# Patient Record
Sex: FEMALE | Race: WHITE | NOT HISPANIC OR LATINO | Employment: OTHER | URBAN - METROPOLITAN AREA
[De-identification: names, ages, dates, MRNs, and addresses within clinical notes are randomized per-mention and may not be internally consistent; named-entity substitution may affect disease eponyms.]

---

## 2019-05-06 ENCOUNTER — HOSPITAL ENCOUNTER (OUTPATIENT)
Dept: RADIOLOGY | Facility: HOSPITAL | Age: 64
Discharge: HOME | End: 2019-05-06
Attending: INTERNAL MEDICINE
Payer: MEDICARE

## 2019-05-06 ENCOUNTER — OFFICE VISIT (OUTPATIENT)
Dept: GASTROENTEROLOGY | Facility: CLINIC | Age: 64
End: 2019-05-06
Payer: MEDICARE

## 2019-05-06 VITALS
TEMPERATURE: 98 F | DIASTOLIC BLOOD PRESSURE: 65 MMHG | BODY MASS INDEX: 28 KG/M2 | OXYGEN SATURATION: 99 % | HEART RATE: 92 BPM | SYSTOLIC BLOOD PRESSURE: 136 MMHG | WEIGHT: 164 LBS | HEIGHT: 64 IN

## 2019-05-06 DIAGNOSIS — K21.00 GASTROESOPHAGEAL REFLUX DISEASE WITH ESOPHAGITIS: ICD-10-CM

## 2019-05-06 DIAGNOSIS — K57.30 DIVERTICULOSIS OF LARGE INTESTINE WITHOUT HEMORRHAGE: ICD-10-CM

## 2019-05-06 DIAGNOSIS — K86.1 IDIOPATHIC CHRONIC PANCREATITIS: ICD-10-CM

## 2019-05-06 DIAGNOSIS — M75.101 PAINFUL ARC SYNDROME OF RIGHT SHOULDER: Primary | ICD-10-CM

## 2019-05-06 DIAGNOSIS — M19.90 ARTHRITIS: ICD-10-CM

## 2019-05-06 DIAGNOSIS — G89.4 CHRONIC PAIN SYNDROME: ICD-10-CM

## 2019-05-06 DIAGNOSIS — K31.1 PARTIAL GASTRIC OUTLET OBSTRUCTION: ICD-10-CM

## 2019-05-06 DIAGNOSIS — M75.101 PAINFUL ARC SYNDROME OF RIGHT SHOULDER: ICD-10-CM

## 2019-05-06 PROCEDURE — 99213 PR OFFICE/OUTPT VISIT, EST, LEVL III, 20-29 MIN: ICD-10-PCS | Mod: S$PBB,,, | Performed by: INTERNAL MEDICINE

## 2019-05-06 PROCEDURE — 99999 PR PBB SHADOW E&M-EST. PATIENT-LVL III: CPT | Mod: PBBFAC,,, | Performed by: INTERNAL MEDICINE

## 2019-05-06 PROCEDURE — 73030 X-RAY EXAM OF SHOULDER: CPT | Mod: 26,RT,, | Performed by: RADIOLOGY

## 2019-05-06 PROCEDURE — 73030 XR SHOULDER COMPLETE 2 OR MORE VIEWS RIGHT: ICD-10-PCS | Mod: 26,RT,, | Performed by: RADIOLOGY

## 2019-05-06 PROCEDURE — 99213 OFFICE O/P EST LOW 20 MIN: CPT | Mod: PBBFAC,25,PN | Performed by: INTERNAL MEDICINE

## 2019-05-06 PROCEDURE — 99999 PR PBB SHADOW E&M-EST. PATIENT-LVL III: ICD-10-PCS | Mod: PBBFAC,,, | Performed by: INTERNAL MEDICINE

## 2019-05-06 PROCEDURE — 73030 X-RAY EXAM OF SHOULDER: CPT | Mod: TC,PN,RT

## 2019-05-06 PROCEDURE — 99213 OFFICE O/P EST LOW 20 MIN: CPT | Mod: S$PBB,,, | Performed by: INTERNAL MEDICINE

## 2019-05-06 RX ORDER — CYANOCOBALAMIN 1000 UG/ML
INJECTION, SOLUTION INTRAMUSCULAR; SUBCUTANEOUS
Refills: 6 | COMMUNITY
Start: 2019-05-02 | End: 2019-08-27 | Stop reason: SDUPTHER

## 2019-05-06 RX ORDER — DILTIAZEM HYDROCHLORIDE 180 MG/1
180 CAPSULE, COATED, EXTENDED RELEASE ORAL
COMMUNITY
End: 2019-06-10

## 2019-05-06 RX ORDER — POTASSIUM CHLORIDE 1.5 G/1.58G
20 POWDER, FOR SOLUTION ORAL
COMMUNITY
End: 2019-06-10

## 2019-05-06 RX ORDER — METFORMIN HYDROCHLORIDE 500 MG/1
TABLET ORAL
Refills: 1 | COMMUNITY
Start: 2019-03-14 | End: 2019-07-03 | Stop reason: SDUPTHER

## 2019-05-06 RX ORDER — OXYCODONE HYDROCHLORIDE 15 MG/1
TABLET ORAL
Refills: 0 | COMMUNITY
Start: 2019-05-02 | End: 2019-12-19

## 2019-05-06 RX ORDER — DEXLANSOPRAZOLE 60 MG/1
60 CAPSULE, DELAYED RELEASE ORAL
COMMUNITY
End: 2019-05-06 | Stop reason: SDUPTHER

## 2019-05-06 RX ORDER — RIZATRIPTAN BENZOATE 10 MG/1
TABLET ORAL
Refills: 3 | COMMUNITY
Start: 2019-04-22 | End: 2019-06-10

## 2019-05-06 RX ORDER — QUETIAPINE FUMARATE 300 MG/1
TABLET, FILM COATED ORAL
Refills: 11 | COMMUNITY
Start: 2019-04-29

## 2019-05-06 RX ORDER — DEXLANSOPRAZOLE 60 MG/1
60 CAPSULE, DELAYED RELEASE ORAL DAILY
Qty: 90 CAPSULE | Refills: 1 | Status: SHIPPED | OUTPATIENT
Start: 2019-05-06 | End: 2019-07-08 | Stop reason: SDUPTHER

## 2019-05-06 RX ORDER — TIZANIDINE 4 MG/1
TABLET ORAL
Refills: 6 | COMMUNITY
Start: 2019-02-12 | End: 2019-06-10

## 2019-05-06 RX ORDER — PROMETHAZINE HYDROCHLORIDE 50 MG/1
50 TABLET ORAL EVERY 6 HOURS PRN
Qty: 100 TABLET | Refills: 1 | Status: SHIPPED | OUTPATIENT
Start: 2019-05-06 | End: 2019-08-27 | Stop reason: SDUPTHER

## 2019-05-06 RX ORDER — FUROSEMIDE 20 MG/1
TABLET ORAL
Refills: 1 | COMMUNITY
Start: 2019-03-25

## 2019-05-06 RX ORDER — VENLAFAXINE HYDROCHLORIDE 37.5 MG/1
37.5 CAPSULE, EXTENDED RELEASE ORAL
COMMUNITY
End: 2019-06-10

## 2019-05-06 RX ORDER — RIVAROXABAN 20 MG/1
20 TABLET, FILM COATED ORAL
Refills: 1 | COMMUNITY
Start: 2019-05-02

## 2019-05-06 RX ORDER — SUMATRIPTAN SUCCINATE 100 MG/1
TABLET ORAL
Refills: 3 | COMMUNITY
Start: 2019-04-02 | End: 2019-06-10

## 2019-05-06 RX ORDER — PROMETHAZINE HYDROCHLORIDE 50 MG/1
50 TABLET ORAL
COMMUNITY
End: 2019-05-06 | Stop reason: SDUPTHER

## 2019-05-06 RX ORDER — HYDRALAZINE HYDROCHLORIDE 25 MG/1
TABLET, FILM COATED ORAL
Refills: 5 | COMMUNITY
Start: 2019-04-22 | End: 2021-04-05

## 2019-05-06 RX ORDER — METOPROLOL SUCCINATE 50 MG/1
TABLET, EXTENDED RELEASE ORAL
Refills: 1 | COMMUNITY
Start: 2019-04-02 | End: 2020-10-06

## 2019-05-06 RX ORDER — CEPHALEXIN 500 MG/1
CAPSULE ORAL
Refills: 5 | COMMUNITY
Start: 2019-04-02 | End: 2019-09-19

## 2019-05-06 NOTE — PATIENT INSTRUCTIONS
He will continue reflux regimen.  She is scheduled for endoscopic dilatation of her gastrojejunostomy.  She has had abdominal pain and nausea and vomiting. She understands the procedure and has been dilated in the past.  Specifically she realizes that this is extremely small incidence of bleeding perforation or aspiration.  She will follow up with her nephrologist and continue her current medications.  She complains of a painful right shoulder.  She appears to have full range of motion x-ray will be obtained.  She will bring me a copy of her labs.  She follows up in the Pain Clinic.

## 2019-05-06 NOTE — PROGRESS NOTES
Subjective:       Patient ID: Tessie De La Rosa is a 64 y.o. female.    Chief Complaint: Follow-up    She complains upper abdominal discomfort with nausea and episodes of vomiting. She has a duodenal obstruction from the chronic pancreatitis and has a gastrojejunostomy in the past the narrowed gastrojejunostomy has been dilated.  She is followed by the nephrologist for stage III renal disease and has anemia.  She denies hematemesis hematochezia jaundice.  She is going to the Pain Clinic catheterization for the chronic abdominal pain and joint pain.  She takes the medications as prescribed.  She has a painful right shoulder I slept on it wrong.  She possibly could have been treated and an x-ray will be obtained. She has a history of arthritis and chronic back pain.  Her  dermatitis is well controlled.  And she is followed by the dermatologist she denies fever chills. She has occasional constipation.  In the last year she has had a colonoscopy showing diverticulosis and hemorrhoids.  She will continue her bowel program to avoid constipation. She had recent labs obtained will bring a copy.  She has had multiple dilatations of her gastrojejunostomy and has good health knowledge in she understands the procedure, the potential risk gone and alternatives.  She realizes there is extremely small his sister bleeding perforation or aspiration      Allergies:  Review of patient's allergies indicates:   Allergen Reactions    Morphine      Muscle spasms, ABD pain    Quinine      Causes thrombocytopenia    Alprazolam     Diazepam     Duloxetine hcl     Pregabalin        Medications:    Current Outpatient Medications:     cephALEXin (KEFLEX) 500 MG capsule, , Disp: , Rfl: 5    cyanocobalamin 1,000 mcg/mL injection, , Disp: , Rfl: 6    dexlansoprazole (DEXILANT) 60 mg capsule, Take 60 mg by mouth., Disp: , Rfl:     diltiaZEM (CARDIZEM CD) 180 MG 24 hr capsule, Take 180 mg by mouth., Disp: , Rfl:     furosemide  (LASIX) 20 MG tablet, , Disp: , Rfl: 1    hydrALAZINE (APRESOLINE) 25 MG tablet, , Disp: , Rfl: 5    metFORMIN (GLUCOPHAGE) 500 MG tablet, , Disp: , Rfl: 1    metoprolol succinate (TOPROL-XL) 50 MG 24 hr tablet, , Disp: , Rfl: 1    oxyCODONE (ROXICODONE) 15 MG Tab, , Disp: , Rfl: 0    potassium chloride (KLOR-CON) 20 mEq Pack, Take 20 mEq by mouth., Disp: , Rfl:     promethazine (PHENERGAN) 50 MG tablet, Take 50 mg by mouth., Disp: , Rfl:     QUEtiapine (SEROQUEL) 300 MG Tab, , Disp: , Rfl: 11    rizatriptan (MAXALT) 10 MG tablet, , Disp: , Rfl: 3    sumatriptan (IMITREX) 100 MG tablet, , Disp: , Rfl: 3    tiZANidine (ZANAFLEX) 4 MG tablet, , Disp: , Rfl: 6    venlafaxine (EFFEXOR-XR) 37.5 MG 24 hr capsule, Take 37.5 mg by mouth., Disp: , Rfl:     XARELTO 20 mg Tab, , Disp: , Rfl: 1    Past Medical History:   Diagnosis Date    Acute pancreatitis     Anemia     Chronic constipation     Diverticulitis     Diverticulosis     GERD (gastroesophageal reflux disease)     Irritable bowel syndrome        Past Surgical History:   Procedure Laterality Date    CHOLECYSTECTOMY      COLONOSCOPY      UPPER GASTROINTESTINAL ENDOSCOPY           Review of Systems   Constitutional: Positive for fatigue. Negative for appetite change, fever and unexpected weight change.   HENT: Negative for trouble swallowing.         No jaundice.   Respiratory: Negative for cough, shortness of breath and wheezing.         No Rales, Rhonchi, or Dyspnea.   Cardiovascular: Negative for chest pain.   Gastrointestinal: Positive for abdominal pain and constipation.        She has chronic pancreatitis and has a chronic pain syndrome.  She is intolerant to the anti-inflammatory agents and is followed in the Pain Clinic.  She denies jaundice or bleeding but her alkaline phosphatase has remained elevated.  She has had a cholecystectomy she has a gastrojejunostomy and has needed to have dilatation at intervals it has been 8 months and she  was recently dilated.  In general she limits her diet and avoidance of foods that cause abdominal discomfort.  She has been able to maintain her weight.  She denies jaundice hematemesis hematochezia or bleeding.  She does have constipation.   Musculoskeletal: Positive for arthralgias and back pain. Negative for neck pain.        Right shoulder pain   Skin: Negative for pallor and rash.   Neurological: Negative for dizziness, seizures, syncope, speech difficulty, weakness and numbness.   Hematological: Negative for adenopathy.   Psychiatric/Behavioral: Negative for confusion.       Objective:      Physical Exam   Constitutional: She is oriented to person, place, and time. She appears well-developed and well-nourished.   She is a well-nourished well-hydrated nonicteric white female   HENT:   Head: Normocephalic.   Eyes: Pupils are equal, round, and reactive to light. EOM are normal.   Neck: Normal range of motion. Neck supple. No tracheal deviation present. No thyromegaly present.   Cardiovascular: Normal rate, regular rhythm and normal heart sounds.   Pulmonary/Chest: Effort normal and breath sounds normal.   Abdominal: Soft. Bowel sounds are normal. She exhibits no mass. There is tenderness. There is no guarding. No hernia.   The abdomen as above plane is mildly obese with he was scars.  There is tenderness in the epigastrium.  Masses or organomegaly not detected.  Bowel sounds are normal.  There are healed surgical scars   Musculoskeletal:   There appears to be limited range of motion of the right shoulder.  There is possibly a swollen area at the at the top it is not warm to touch and is not red in nature   Lymphadenopathy:     She has no cervical adenopathy.   Neurological: She is alert and oriented to person, place, and time. No cranial nerve deficit.   Skin: Skin is warm and dry.   Psychiatric: She has a normal mood and affect. Her behavior is normal.   Vitals reviewed.        Plan:       Idiopathic chronic  pancreatitis    Partial gastric outlet obstruction    Gastroesophageal reflux disease with esophagitis    Diverticulosis of large intestine without hemorrhage    Chronic pain syndrome

## 2019-05-07 ENCOUNTER — TELEPHONE (OUTPATIENT)
Dept: GASTROENTEROLOGY | Facility: CLINIC | Age: 64
End: 2019-05-07

## 2019-05-07 DIAGNOSIS — K22.2 ESOPHAGEAL STRICTURE: Primary | ICD-10-CM

## 2019-05-07 NOTE — TELEPHONE ENCOUNTER
Informed pt hat provider did not send in muscle relaxer due to interactions with other medications she takes. Also informed pt that the OR will call her the day before the procedure to give her an arrival time for the procedure. Pt verbalized understanding.

## 2019-05-07 NOTE — TELEPHONE ENCOUNTER
----- Message from Ignacio Perez sent at 5/7/2019  8:19 AM CDT -----  Contact: patient  Type: Needs Medical Advice    Who Called:  Patient  Symptoms (please be specific):    How long has patient had these symptoms:    Pharmacy name and phone #:  [facility],[place] [phone]  Best Call Back Number: [phone]  Additional Information: called to advise that Rx wasn't sent for muscle relaxer to pharmacy.and wanted to apologize to the  for yesterday.stated that she was in a lot of pain and still is today. Requesting a call back regarding procedure

## 2019-05-21 ENCOUNTER — ANESTHESIA EVENT (OUTPATIENT)
Dept: SURGERY | Facility: HOSPITAL | Age: 64
End: 2019-05-21
Payer: MEDICARE

## 2019-05-21 ENCOUNTER — ANESTHESIA (OUTPATIENT)
Dept: SURGERY | Facility: HOSPITAL | Age: 64
End: 2019-05-21
Payer: MEDICARE

## 2019-05-21 ENCOUNTER — HOSPITAL ENCOUNTER (OUTPATIENT)
Facility: HOSPITAL | Age: 64
Discharge: HOME | End: 2019-05-21
Attending: INTERNAL MEDICINE | Admitting: INTERNAL MEDICINE
Payer: MEDICARE

## 2019-05-21 VITALS
BODY MASS INDEX: 27.31 KG/M2 | DIASTOLIC BLOOD PRESSURE: 71 MMHG | HEIGHT: 64 IN | OXYGEN SATURATION: 97 % | HEART RATE: 77 BPM | SYSTOLIC BLOOD PRESSURE: 119 MMHG | RESPIRATION RATE: 15 BRPM | TEMPERATURE: 98 F | WEIGHT: 160 LBS

## 2019-05-21 DIAGNOSIS — K22.2 ESOPHAGEAL STRICTURE: ICD-10-CM

## 2019-05-21 DIAGNOSIS — Z01.818 PRE-OP EXAM: ICD-10-CM

## 2019-05-21 DIAGNOSIS — K56.699 ANASTOMOTIC STENOSIS OF GASTROJEJUNOSTOMY: Primary | ICD-10-CM

## 2019-05-21 PROBLEM — K21.00 GASTROESOPHAGEAL REFLUX DISEASE WITH ESOPHAGITIS: Status: ACTIVE | Noted: 2019-05-21

## 2019-05-21 PROBLEM — R14.0 ABDOMINAL DISTENSION: Status: ACTIVE | Noted: 2019-05-21

## 2019-05-21 PROBLEM — R10.9 ABDOMINAL PAIN: Status: ACTIVE | Noted: 2019-05-21

## 2019-05-21 PROBLEM — K44.9 HIATAL HERNIA: Status: ACTIVE | Noted: 2019-05-21

## 2019-05-21 PROBLEM — R11.0 NAUSEA: Status: ACTIVE | Noted: 2019-05-21

## 2019-05-21 LAB — POCT GLUCOSE: 161 MG/DL (ref 70–110)

## 2019-05-21 PROCEDURE — D9220A PRA ANESTHESIA: ICD-10-PCS | Mod: ANES,,, | Performed by: ANESTHESIOLOGY

## 2019-05-21 PROCEDURE — 37000008 HC ANESTHESIA 1ST 15 MINUTES: Performed by: INTERNAL MEDICINE

## 2019-05-21 PROCEDURE — 63600175 PHARM REV CODE 636 W HCPCS: Performed by: NURSE ANESTHETIST, CERTIFIED REGISTERED

## 2019-05-21 PROCEDURE — D9220A PRA ANESTHESIA: Mod: ANES,,, | Performed by: ANESTHESIOLOGY

## 2019-05-21 PROCEDURE — C1726 CATH, BAL DIL, NON-VASCULAR: HCPCS | Performed by: INTERNAL MEDICINE

## 2019-05-21 PROCEDURE — S0028 INJECTION, FAMOTIDINE, 20 MG: HCPCS

## 2019-05-21 PROCEDURE — 43235 PR EGD, FLEX, DIAGNOSTIC: ICD-10-PCS | Mod: ,,, | Performed by: INTERNAL MEDICINE

## 2019-05-21 PROCEDURE — 25000003 PHARM REV CODE 250

## 2019-05-21 PROCEDURE — D9220A PRA ANESTHESIA: ICD-10-PCS | Mod: CRNA,,, | Performed by: NURSE ANESTHETIST, CERTIFIED REGISTERED

## 2019-05-21 PROCEDURE — 43235 EGD DIAGNOSTIC BRUSH WASH: CPT | Mod: ,,, | Performed by: INTERNAL MEDICINE

## 2019-05-21 PROCEDURE — 93005 ELECTROCARDIOGRAM TRACING: CPT

## 2019-05-21 PROCEDURE — 37000009 HC ANESTHESIA EA ADD 15 MINS: Performed by: INTERNAL MEDICINE

## 2019-05-21 PROCEDURE — 43249 ESOPH EGD DILATION <30 MM: CPT | Performed by: INTERNAL MEDICINE

## 2019-05-21 PROCEDURE — 25000003 PHARM REV CODE 250: Performed by: ANESTHESIOLOGY

## 2019-05-21 PROCEDURE — D9220A PRA ANESTHESIA: Mod: CRNA,,, | Performed by: NURSE ANESTHETIST, CERTIFIED REGISTERED

## 2019-05-21 RX ORDER — DIPHENHYDRAMINE HYDROCHLORIDE 50 MG/ML
12.5 INJECTION INTRAMUSCULAR; INTRAVENOUS
Status: DISCONTINUED | OUTPATIENT
Start: 2019-05-21 | End: 2019-05-21 | Stop reason: HOSPADM

## 2019-05-21 RX ORDER — ONDANSETRON 2 MG/ML
4 INJECTION INTRAMUSCULAR; INTRAVENOUS DAILY PRN
Status: DISCONTINUED | OUTPATIENT
Start: 2019-05-21 | End: 2019-05-21 | Stop reason: HOSPADM

## 2019-05-21 RX ORDER — PROPOFOL 10 MG/ML
VIAL (ML) INTRAVENOUS
Status: DISCONTINUED | OUTPATIENT
Start: 2019-05-21 | End: 2019-05-21

## 2019-05-21 RX ORDER — SODIUM CHLORIDE, SODIUM LACTATE, POTASSIUM CHLORIDE, CALCIUM CHLORIDE 600; 310; 30; 20 MG/100ML; MG/100ML; MG/100ML; MG/100ML
125 INJECTION, SOLUTION INTRAVENOUS CONTINUOUS
Status: DISCONTINUED | OUTPATIENT
Start: 2019-05-21 | End: 2019-05-21 | Stop reason: HOSPADM

## 2019-05-21 RX ORDER — LIDOCAINE HYDROCHLORIDE 10 MG/ML
1 INJECTION, SOLUTION EPIDURAL; INFILTRATION; INTRACAUDAL; PERINEURAL ONCE
Status: DISCONTINUED | OUTPATIENT
Start: 2019-05-21 | End: 2019-05-21 | Stop reason: HOSPADM

## 2019-05-21 RX ORDER — FAMOTIDINE 20 MG/50ML
20 INJECTION, SOLUTION INTRAVENOUS
Status: DISCONTINUED | OUTPATIENT
Start: 2019-05-21 | End: 2019-05-21 | Stop reason: HOSPADM

## 2019-05-21 RX ORDER — SODIUM CHLORIDE, SODIUM LACTATE, POTASSIUM CHLORIDE, CALCIUM CHLORIDE 600; 310; 30; 20 MG/100ML; MG/100ML; MG/100ML; MG/100ML
INJECTION, SOLUTION INTRAVENOUS CONTINUOUS
Status: DISCONTINUED | OUTPATIENT
Start: 2019-05-21 | End: 2019-05-21 | Stop reason: HOSPADM

## 2019-05-21 RX ORDER — FAMOTIDINE 10 MG/ML
INJECTION INTRAVENOUS
Status: COMPLETED
Start: 2019-05-21 | End: 2019-05-21

## 2019-05-21 RX ADMIN — FAMOTIDINE 20 MG: 10 INJECTION INTRAVENOUS at 07:05

## 2019-05-21 RX ADMIN — PROPOFOL 50 MG: 10 INJECTION, EMULSION INTRAVENOUS at 07:05

## 2019-05-21 RX ADMIN — SODIUM CHLORIDE, POTASSIUM CHLORIDE, SODIUM LACTATE AND CALCIUM CHLORIDE: 600; 310; 30; 20 INJECTION, SOLUTION INTRAVENOUS at 07:05

## 2019-05-21 NOTE — [employer] PATIENT INSTRUCTIONS
Discharge Summary/Instructions after an Endoscopic Procedure  Patient Name: Tessie De La Rosa  Patient MRN: [id number]  Patient YOB: 1955  Tuesday, May 21, 2019  Kareem Lopez MD  RESTRICTIONS:  During your procedure today, you received medications for sedation.  These   medications may affect your judgment, balance and coordination.  Therefore,   for 24 hours, you have the following restrictions:   - DO NOT drive a car, operate machinery, make legal/financial decisions,   sign important papers or drink alcohol.    ACTIVITY:  Today: no heavy lifting, straining or running due to procedural   sedation/anesthesia.  The following day: return to full activity including work.  DIET:  Eat and drink normally unless instructed otherwise.     TREATMENT FOR COMMON SIDE EFFECTS:  - Mild abdominal pain, nausea, belching, bloating or excessive gas:  rest,   eat lightly and use a heating pad.  - Sore Throat: treat with throat lozenges and/or gargle with warm salt   water.  - Because air was used during the procedure, expelling large amounts of air   from your rectum or belching is normal.  - If a bowel prep was taken, you may not have a bowel movement for 1-3 days.    This is normal.  SYMPTOMS TO WATCH FOR AND REPORT TO YOUR PHYSICIAN:  1. Abdominal pain or bloating, other than gas cramps.  2. Chest pain.  3. Back pain.  4. Signs of infection such as: chills or fever occurring within 24 hours   after the procedure.  5. Rectal bleeding, which would show as bright red, maroon, or black stools.   (A tablespoon of blood from the rectum is not serious, especially if   hemorrhoids are present.)  6. Vomiting.  7. Weakness or dizziness.  GO DIRECTLY TO THE NEAREST EMERGENCY ROOM IF YOU HAVE ANY OF THE FOLLOWING:      Difficulty breathing              Chills and/or fever over 101 F   Persistent vomiting and/or vomiting blood   Severe abdominal pain   Severe chest pain   Black, tarry stools   Bleeding- more than one tablespoon   Any  other symptom or condition that you feel may need urgent attention  Your doctor recommends these additional instructions:  If any biopsies were taken, your doctors clinic will contact you in 1 to 2   weeks with any results.  - Discharge patient to home (ambulatory).   - Resume previous diet.   - Continue present medications.   - Discharge patient to home (ambulatory).  For questions, problems or results please call your physician - Kareem Lopez MD at Work:  [phone].  [facility] EMERGENCY ROOM PHONE NUMBER: [phone]  IF A COMPLICATION OR EMERGENCY SITUATION ARISES AND YOU ARE UNABLE TO REACH   YOUR PHYSICIAN - GO DIRECTLY TO THE EMERGENCY ROOM.  Tristen Mccaulye, Admin.  For: Kareem Lopez MD  5/21/2019 9:51:44 AM  This report has been verified and signed electronically.  [employer]

## 2019-05-21 NOTE — PLAN OF CARE
Pt aaox3, pts  called to bedside.  at bedside discussing procedure and results, questions answered

## 2019-05-21 NOTE — ANESTHESIA PREPROCEDURE EVALUATION
05/21/2019  Tessie De La Rosa is a 64 y.o., female.    Anesthesia Evaluation    I have reviewed the Patient Summary Reports.    I have reviewed the Nursing Notes.      Review of Systems  Social:  Non-Smoker    Hematology/Oncology:  Hematology Normal   Oncology Normal     Cardiovascular:  Cardiovascular Normal     Pulmonary:  Pulmonary Normal    Hepatic/GI:   GERD    Musculoskeletal:  Musculoskeletal Normal    Neurological:  Neurology Normal    Endocrine:  Endocrine Normal    Dermatological:  Skin Normal    Psych:   Psychiatric History (??? on a large seroquel dose)          Physical Exam  General:  Well nourished    Airway/Jaw/Neck:  Airway Findings: Mouth Opening: Normal Tongue: Normal  General Airway Assessment: Adult  Mallampati: II  TM Distance: Normal, at least 6 cm      Dental:  Dental Findings: Upper Dentures, Lower Dentures   Chest/Lungs:  Chest/Lungs Findings: Clear to auscultation     Heart/Vascular:  Heart Findings: Rate: Normal  Rhythm: Regular Rhythm        Mental Status:  Mental Status Findings:  Cooperative, Alert and Oriented         Anesthesia Plan  Type of Anesthesia, risks & benefits discussed:  Anesthesia Type:  general  Patient's Preference:   Intra-op Monitoring Plan: standard ASA monitors  Intra-op Monitoring Plan Comments:   Post Op Pain Control Plan:   Post Op Pain Control Plan Comments:   Induction:   IV  Beta Blocker:  Patient is not currently on a Beta-Blocker (No further documentation required).       Informed Consent: Patient understands risks and agrees with Anesthesia plan.  Questions answered. Anesthesia consent signed with patient.  ASA Score: 3     Day of Surgery Review of History & Physical: I have interviewed and examined the patient. I have reviewed the patient's H&P dated:            Ready For Surgery From Anesthesia Perspective.

## 2019-05-21 NOTE — H&P
She has a history of a hiatal hernia esophageal stricture and air gastrojejunostomy.  She has a history of diverticulosis and hemorrhoids.  She is not a surgical candidate for revision of the gastrojejunostomy.  With dilatation she has done extremely well. The history and physical have not changed since the last office visit.

## 2019-05-21 NOTE — BRIEF OP NOTE
Procedure was esophageal gastro did do an anoscopy with dilatation of a of gastrojejunostomy.  Last dilatation was a year ago up.  She has complained of abdominal distension nausea without vomiting jaundice or bleeding.  She has good health nursing she understands the procedures of upper endoscopy and dilatation of the gastrojejunostomy.  Anesthesia is monitored anesthesia coverage.  Procedure:  Garth with the patient in left lateral supine position in the procedure room the Olympus video upper endoscope was passed without difficulty.  The gastroesophageal junction was at 37-38 cm cm with the SQ junction at 37 cm.  There is mild hyperemia.  Diaphragmatic hiatus appeared to be at 39 cm.  The cardia body and antrum was mildly hyperemic.  There is minimal retained secretions.  The pylorus was identified.  The gastrojejunostomy was identified.  It was narrowed.  The number 18 balloon was inflated without difficulty. Patient tolerated the procedure well.  Impression 1.  Narrowed gastrojejunostomy 2.  Bile gastritis 3.  Hiatal hernia.  Patient tolerated the procedure well

## 2019-05-21 NOTE — DISCHARGE INSTRUCTIONS
Abdominal Pain    Abdominal pain is pain in the stomach or belly area. Everyone has this pain from time to time. In many cases it goes away on its own. But abdominal pain can sometimes be due to a serious problem, such as appendicitis. So its important to know when to seek help.  Causes of abdominal pain  There are many possible causes of abdominal pain. Common causes in adults include:  · Constipation, diarrhea, or gas  · Stomach acid flowing back up into the esophagus (acid reflux or heartburn)  · Severe acid reflux, called GERD (gastroesophageal reflux disease)  · A sore in the lining of the stomach or small intestine (peptic ulcer)  · Inflammation of the gallbladder, liver, or pancreas  · Gallstones or kidney stones  · Appendicitis   · Intestinal blockage   · An internal organ pushing through a muscle or other tissue (hernia)  · Urinary tract infections  · In women, menstrual cramps, fibroids, or endometriosis  · Inflammation or infection of the intestines  Diagnosing the cause of abdominal pain  Your healthcare provider will do a physical exam help find the cause of your pain. If needed, tests will be ordered. Belly pain has many possible causes. So it can be hard to find the reason for your pain. Giving details about your pain can help. Tell your provider where and when you feel the pain, and what makes it better or worse. Also let your provider know if you have other symptoms such as:  · Fever  · Tiredness  · Upset stomach (nausea)  · Vomiting  · Changes in bathroom habits  Treating abdominal pain  Some causes of pain need emergency medical treatment right away. These include appendicitis or a bowel blockage. Other problems can be treated with rest, fluids, or medicines. Your healthcare provider can give you specific instructions for treatment or self-care based on what is causing your pain.  If you have vomiting or diarrhea, sip water or other clear fluids. When you are ready to eat solid foods again,  start with small amounts of easy-to-digest, low-fat foods. These include apple sauce, toast, or crackers.   When to seek medical care  Call 911 or go to the hospital right away if you:  · Cant pass stool and are vomiting  · Are vomiting blood or have bloody diarrhea or black, tarry diarrhea  · Have chest, neck, or shoulder pain  · Feel like you might pass out  · Have pain in your shoulder blades with nausea  · Have sudden, severe belly pain  · Have new, severe pain unlike any you have felt before  · Have a belly that is rigid, hard, and tender to touch  Call your healthcare provider if you have:  · Pain for more than 5 days  · Bloating for more than 2 days  · Diarrhea for more than 5 days  · A fever of 100.4°F (38.0°C) or higher, or as directed by your provider  · Pain that gets worse  · Weight loss for no reason  · Continued lack of appetite  · Blood in your stool  How to prevent abdominal pain  Here are some tips to help prevent abdominal pain:  · Eat smaller amounts of food at one time.  · Avoid greasy, fried, or other high-fat foods.  · Avoid foods that give you gas.  · Exercise regularly.  · Drink plenty of fluids.  To help prevent GERD symptoms:  · Quit smoking.  · Reduce alcohol and certain foods that increase stomach acid.  · Avoid aspirin and over-the-counter pain and fever medicines (NSAIDS or nonsteroidal anti-inflammatory drugs), if possible  · Lose extra weight.  · Finish eating at least 2 hours before you go to bed or lie down.  · Raise the head of your bed.  Date Last Reviewed: 7/1/2016  © 5516-0035 "[employer]. [address] [place] [address], [place] [address]. All rights reserved. This information is not intended as a substitute for professional medical care. Always follow your healthcare professional's instructions.

## 2019-05-21 NOTE — TRANSFER OF CARE
"Anesthesia Transfer of Care Note    Patient: Tessie De La Rosa    Procedure(s) Performed: Procedure(s) (LRB):  DILATION, ESOPHAGUS (N/A)  EGD (ESOPHAGOGASTRODUODENOSCOPY) (N/A)    Patient location: PACU    Anesthesia Type: general    Transport from OR: Transported from OR on room air with adequate spontaneous ventilation    Post pain: adequate analgesia    Post assessment: no apparent anesthetic complications and tolerated procedure well    Post vital signs: stable    Level of consciousness: awake, alert and oriented    Nausea/Vomiting: no nausea/vomiting    Complications: none    Transfer of care protocol was followedComments: Right hearing aid in ear.  Left hearing aid given to RN in pink denture cup.      Last vitals:   Visit Vitals  BP (!) 142/68 (BP Location: Right arm, Patient Position: Lying)   Pulse 85   Temp 36.5 °C (97.7 °F) (Oral)   Resp 18   Ht 5' 4" (1.626 m)   Wt 72.6 kg (160 lb)   SpO2 98%   Breastfeeding? No   BMI 27.46 kg/m²     "

## 2019-05-21 NOTE — ANESTHESIA POSTPROCEDURE EVALUATION
Anesthesia Post Evaluation    Patient: Tessie De La Rosa    Procedure(s) Performed: Procedure(s) (LRB):  DILATION, ESOPHAGUS (N/A)  EGD (ESOPHAGOGASTRODUODENOSCOPY) (N/A)    Final Anesthesia Type: general  Patient location during evaluation: PACU  Patient participation: Yes- Able to Participate  Level of consciousness: awake and alert  Post-procedure vital signs: reviewed and stable  Pain management: adequate  Airway patency: patent  PONV status at discharge: No PONV  Anesthetic complications: no      Cardiovascular status: blood pressure returned to baseline  Respiratory status: unassisted  Hydration status: euvolemic  Follow-up not needed.          Vitals Value Taken Time   /71 5/21/2019  8:27 AM   Temp 36.8 °C (98.3 °F) 5/21/2019  8:27 AM   Pulse 77 5/21/2019  8:27 AM   Resp 15 5/21/2019  8:27 AM   SpO2 97 % 5/21/2019  8:27 AM         No case tracking events are documented in the log.      Pain/Sravani Score: Sravani Score: 10 (5/21/2019  8:27 AM)

## 2019-05-21 NOTE — DISCHARGE SUMMARY
She will continue her reflux regimen current medications vitamins and minerals.  She will continue her usual diet but avoid the off any foods.  She has a follow-up appointment in the office in 1 week

## 2019-05-24 ENCOUNTER — OFFICE VISIT (OUTPATIENT)
Dept: GASTROENTEROLOGY | Facility: CLINIC | Age: 64
End: 2019-05-24
Payer: MEDICARE

## 2019-05-24 VITALS
OXYGEN SATURATION: 97 % | HEART RATE: 78 BPM | BODY MASS INDEX: 28.34 KG/M2 | DIASTOLIC BLOOD PRESSURE: 73 MMHG | HEIGHT: 64 IN | WEIGHT: 166 LBS | SYSTOLIC BLOOD PRESSURE: 149 MMHG

## 2019-05-24 DIAGNOSIS — K57.30 DIVERTICULOSIS OF LARGE INTESTINE WITHOUT HEMORRHAGE: ICD-10-CM

## 2019-05-24 DIAGNOSIS — Z98.0 HISTORY OF BYPASS GASTROJEJUNOSTOMY: ICD-10-CM

## 2019-05-24 DIAGNOSIS — K44.9 HIATAL HERNIA: ICD-10-CM

## 2019-05-24 DIAGNOSIS — G89.4 CHRONIC PAIN SYNDROME: ICD-10-CM

## 2019-05-24 DIAGNOSIS — K21.9 GASTROESOPHAGEAL REFLUX DISEASE WITHOUT ESOPHAGITIS: ICD-10-CM

## 2019-05-24 DIAGNOSIS — K86.1 CHRONIC PANCREATITIS, UNSPECIFIED PANCREATITIS TYPE: ICD-10-CM

## 2019-05-24 DIAGNOSIS — D50.9 IRON DEFICIENCY ANEMIA, UNSPECIFIED IRON DEFICIENCY ANEMIA TYPE: Primary | ICD-10-CM

## 2019-05-24 PROCEDURE — 99213 OFFICE O/P EST LOW 20 MIN: CPT | Mod: PBBFAC,PN | Performed by: INTERNAL MEDICINE

## 2019-05-24 PROCEDURE — 99999 PR PBB SHADOW E&M-EST. PATIENT-LVL III: CPT | Mod: PBBFAC,,, | Performed by: INTERNAL MEDICINE

## 2019-05-24 PROCEDURE — 99213 OFFICE O/P EST LOW 20 MIN: CPT | Mod: S$PBB,,, | Performed by: INTERNAL MEDICINE

## 2019-05-24 PROCEDURE — 99213 PR OFFICE/OUTPT VISIT, EST, LEVL III, 20-29 MIN: ICD-10-PCS | Mod: S$PBB,,, | Performed by: INTERNAL MEDICINE

## 2019-05-24 PROCEDURE — 99999 PR PBB SHADOW E&M-EST. PATIENT-LVL III: ICD-10-PCS | Mod: PBBFAC,,, | Performed by: INTERNAL MEDICINE

## 2019-05-24 RX ORDER — ERGOCALCIFEROL 1.25 MG/1
CAPSULE ORAL
Refills: 2 | COMMUNITY
Start: 2019-05-07 | End: 2021-04-05 | Stop reason: ALTCHOICE

## 2019-05-24 RX ORDER — BACLOFEN 10 MG/1
TABLET ORAL
Refills: 0 | COMMUNITY
Start: 2019-05-13 | End: 2021-04-05 | Stop reason: ALTCHOICE

## 2019-05-24 RX ORDER — HEPARIN 100 UNIT/ML
500 SYRINGE INTRAVENOUS
Status: CANCELLED | OUTPATIENT
Start: 2019-05-24

## 2019-05-24 RX ORDER — SODIUM CHLORIDE 9 MG/ML
10 INJECTION, SOLUTION INTRAMUSCULAR; INTRAVENOUS; SUBCUTANEOUS
Status: CANCELLED | OUTPATIENT
Start: 2019-05-24

## 2019-05-24 RX ORDER — ACETAMINOPHEN 325 MG/1
1000 TABLET ORAL
Status: CANCELLED | OUTPATIENT
Start: 2019-05-24

## 2019-05-24 RX ORDER — CYCLOBENZAPRINE HCL 10 MG
TABLET ORAL
Refills: 0 | COMMUNITY
Start: 2019-05-09 | End: 2019-06-10

## 2019-05-24 NOTE — PATIENT INSTRUCTIONS
She will continue her current medications vitamins minerals.  She will receive intravenous iron because she will not be able to absorb it.  She continues her arm activities.

## 2019-05-24 NOTE — PROGRESS NOTES
Subjective:       Patient ID: Tessie De La Rosa is a 64 y.o. female.    Chief Complaint: Follow-up    Last week she underwent upper endoscopy and dilatation of her narrowed gastrojejunostomy.  She states the abdominal distension and an abdominal discomfort have markedly improved.  She has chronic nausea but denies vomiting hematemesis hematochezia jaundice or bleeding.  She has a history of chronic pancreatitis with duodenal obstruction.  Her current medications have resolved the pyrosis and dyspepsia.  She denies aspiration cardiopulmonary symptoms jaundice of bleeding hematemesis or hematochezia. She denies tobacco or alcohol.  She has never consumed alcohol in her life.  Her sister and niece both had pancreatitis.  They were not alcohol users and it is thought she possibly could have familial or idiopathic pancreatitis.  She goes to the [facility].  She is intolerant to the anti-inflammatory agents.  Her pain is markedly improved.  She has history of diverticulosis and internal hemorrhoids but is having daily bowel movements.  She takes her medications as prescribed.  She was found to have iron deficiency probably related to the surgery and inability to absorb the iron in the duodenum because of the gastrojejunostomy.  She is intolerant to oral iron.  She does take her vitamins including the vitamin-D.  Emotionally she is doing very well. She has had multiple abdominal surgeries.  Her general surgeon states that he will not operate on her again unless it is life threatening.  She feels that as long as her gastrojejunostomy is dilated she can function well and continue her current medication      Allergies:  Review of patient's allergies indicates:   Allergen Reactions    Morphine      Muscle spasms, ABD pain    Quinine      Causes thrombocytopenia    Alprazolam     Diazepam     Duloxetine hcl     Pregabalin        Medications:    Current Outpatient Medications:     cephALEXin (KEFLEX) 500 MG  capsule, , Disp: , Rfl: 5    cyanocobalamin 1,000 mcg/mL injection, , Disp: , Rfl: 6    dexlansoprazole (DEXILANT) 60 mg capsule, Take 1 capsule (60 mg total) by mouth once daily., Disp: 90 capsule, Rfl: 1    diltiaZEM (CARDIZEM CD) 180 MG 24 hr capsule, Take 180 mg by mouth., Disp: , Rfl:     furosemide (LASIX) 20 MG tablet, , Disp: , Rfl: 1    hydrALAZINE (APRESOLINE) 25 MG tablet, , Disp: , Rfl: 5    metFORMIN (GLUCOPHAGE) 500 MG tablet, , Disp: , Rfl: 1    metoprolol succinate (TOPROL-XL) 50 MG 24 hr tablet, , Disp: , Rfl: 1    oxyCODONE (ROXICODONE) 15 MG Tab, , Disp: , Rfl: 0    potassium chloride (KLOR-CON) 20 mEq Pack, Take 20 mEq by mouth., Disp: , Rfl:     promethazine (PHENERGAN) 50 MG tablet, Take 1 tablet (50 mg total) by mouth every 6 (six) hours as needed for Nausea., Disp: 100 tablet, Rfl: 1    QUEtiapine (SEROQUEL) 300 MG Tab, , Disp: , Rfl: 11    rizatriptan (MAXALT) 10 MG tablet, , Disp: , Rfl: 3    sumatriptan (IMITREX) 100 MG tablet, , Disp: , Rfl: 3    tiZANidine (ZANAFLEX) 4 MG tablet, , Disp: , Rfl: 6    venlafaxine (EFFEXOR-XR) 37.5 MG 24 hr capsule, Take 37.5 mg by mouth., Disp: , Rfl:     XARELTO 20 mg Tab, , Disp: , Rfl: 1    baclofen (LIORESAL) 10 MG tablet, , Disp: , Rfl: 0    cyclobenzaprine (FLEXERIL) 10 MG tablet, , Disp: , Rfl: 0    ergocalciferol (ERGOCALCIFEROL) 50,000 unit Cap, , Disp: , Rfl: 2    Past Medical History:   Diagnosis Date    Acute pancreatitis     Anemia     Chronic constipation     Diverticulitis     Diverticulosis     GERD (gastroesophageal reflux disease)     Irritable bowel syndrome        Past Surgical History:   Procedure Laterality Date    CHOLECYSTECTOMY      COLONOSCOPY      DILATION, ESOPHAGUS N/A 5/21/2019    [facility]    EGD (ESOPHAGOGASTRODUODENOSCOPY) N/A 5/21/2019    [facility]    UPPER GASTROINTESTINAL ENDOSCOPY           Review of Systems    Constitutional: Negative for appetite change, fever and unexpected weight change.   HENT: Negative for trouble swallowing.         She has a history of possibly migraine headaches although she has not had a headache in quite a while.   Eyes: Negative.    Respiratory: Negative for cough, shortness of breath and wheezing.         No Rales, Rhonchi, or Dyspnea.   Cardiovascular: Negative for chest pain.        She states her hypertension is well controlled.   Gastrointestinal: Positive for abdominal distention and abdominal pain. Negative for anal bleeding, blood in stool and constipation.        She has some upper abdominal discomfort.  This is a chronic problem since her surgery.  She denies specific pain.  She has chronic nausea without vomiting. Her current medications include including the Dexilant in the Phenergan have controlled the reflux and nausea   Endocrine:        She states her diabetes is well controlled.  She has also seen the nephrologist for chronic renal disease.  I have requested the records   Musculoskeletal: Positive for arthralgias and back pain. Negative for neck pain.        She has arthralgias and chronic back pain.  She goes to the Pain Clinic.  She avoids anti-inflammatory agent.  The current medications have really helped her.  She is on vitamin-D supplementation.  Her PCP did found that she was vitamin D deficient.  She is also taking her calcium.   Skin: Negative for pallor and rash.   Neurological: Negative for dizziness, seizures, syncope, speech difficulty, weakness and numbness.   Hematological: Negative for adenopathy.   Psychiatric/Behavioral: Negative for confusion.       Objective:      Physical Exam   Constitutional: She is oriented to person, place, and time. She appears well-developed and well-nourished.   Well-nourished well-hydrated overweight nonicteric white female   HENT:   Head: Normocephalic.   Eyes: Pupils are equal, round, and reactive to light. EOM are normal.    Neck: Normal range of motion. Neck supple. No tracheal deviation present. No thyromegaly present.   Cardiovascular: Normal rate, regular rhythm and normal heart sounds.   Pulmonary/Chest: Effort normal and breath sounds normal.   Abdominal: Soft. Bowel sounds are normal. She exhibits no distension and no mass. There is tenderness. There is no rebound and no guarding. No hernia.   The abdomen is soft above plane with mild tenderness in the epigastrium.  There healed surgical scars.  Organomegaly masses are not detected.  Bowel sounds are normal.   Musculoskeletal: Normal range of motion.   Lymphadenopathy:     She has no cervical adenopathy.   Neurological: She is alert and oriented to person, place, and time. No cranial nerve deficit.   Skin: Skin is warm and dry.   Psychiatric: She has a normal mood and affect. Her behavior is normal.   Vitals reviewed.        Plan:       Iron deficiency anemia, unspecified iron deficiency anemia type    Hiatal hernia    Gastroesophageal reflux disease without esophagitis    Diverticulosis of large intestine without hemorrhage    Chronic pain syndrome    History of bypass gastrojejunostomy    Chronic pancreatitis, unspecified pancreatitis type

## 2019-05-28 ENCOUNTER — TELEPHONE (OUTPATIENT)
Dept: GASTROENTEROLOGY | Facility: CLINIC | Age: 64
End: 2019-05-28

## 2019-05-28 ENCOUNTER — TELEPHONE (OUTPATIENT)
Dept: INFUSION THERAPY | Facility: HOSPITAL | Age: 64
End: 2019-05-28

## 2019-05-28 VITALS
SYSTOLIC BLOOD PRESSURE: 188 MMHG | RESPIRATION RATE: 18 BRPM | HEART RATE: 80 BPM | TEMPERATURE: 98 F | DIASTOLIC BLOOD PRESSURE: 84 MMHG

## 2019-05-28 NOTE — TELEPHONE ENCOUNTER
----- Message from Asia Jose sent at 5/28/2019 10:30 AM CDT -----  Contact: Out patient  Please call out patient about some orders [phone]

## 2019-05-28 NOTE — TELEPHONE ENCOUNTER
Received phone call from Arianna Claros RN from [facility] infusion clinic. She stated they no longer perform iron dextron infusions at the [place] location. She suggested ordering Injectafer (therapy plan of 2 infusions), or Venofer (therapy plan of 4 infusions). Must cancel current therapy plan, and re-do with correct infusion ordered.

## 2019-05-28 NOTE — TELEPHONE ENCOUNTER
Spoke with infusion nurse at outpatient infusion therapy. She stated orders had not been signed for this pt tx. Nurse stated she was able to sign them, but depending on level of care, she isn't always able. Made aware for future orders.

## 2019-05-29 ENCOUNTER — TELEPHONE (OUTPATIENT)
Dept: GASTROENTEROLOGY | Facility: CLINIC | Age: 64
End: 2019-05-29

## 2019-05-30 RX ORDER — SODIUM CHLORIDE 0.9 % (FLUSH) 0.9 %
10 SYRINGE (ML) INJECTION
Status: CANCELLED | OUTPATIENT
Start: 2019-05-30

## 2019-05-30 RX ORDER — HEPARIN 100 UNIT/ML
5 SYRINGE INTRAVENOUS
Status: CANCELLED | OUTPATIENT
Start: 2019-05-30

## 2019-05-30 RX ORDER — SODIUM CHLORIDE 9 MG/ML
INJECTION, SOLUTION INTRAVENOUS CONTINUOUS
Status: CANCELLED | OUTPATIENT
Start: 2019-05-30

## 2019-05-31 ENCOUNTER — TELEPHONE (OUTPATIENT)
Dept: GASTROENTEROLOGY | Facility: CLINIC | Age: 64
End: 2019-05-31

## 2019-05-31 ENCOUNTER — INFUSION (OUTPATIENT)
Dept: INFUSION THERAPY | Facility: HOSPITAL | Age: 64
End: 2019-05-31
Attending: INTERNAL MEDICINE
Payer: MEDICARE

## 2019-05-31 VITALS
HEART RATE: 83 BPM | TEMPERATURE: 99 F | RESPIRATION RATE: 18 BRPM | SYSTOLIC BLOOD PRESSURE: 190 MMHG | DIASTOLIC BLOOD PRESSURE: 79 MMHG

## 2019-05-31 DIAGNOSIS — D50.9 IRON DEFICIENCY ANEMIA, UNSPECIFIED IRON DEFICIENCY ANEMIA TYPE: Primary | ICD-10-CM

## 2019-05-31 PROCEDURE — 99211 OFF/OP EST MAY X REQ PHY/QHP: CPT

## 2019-05-31 RX ORDER — SODIUM CHLORIDE 9 MG/ML
INJECTION, SOLUTION INTRAVENOUS CONTINUOUS
Status: CANCELLED | OUTPATIENT
Start: 2019-06-07

## 2019-05-31 RX ORDER — SODIUM CHLORIDE 0.9 % (FLUSH) 0.9 %
10 SYRINGE (ML) INJECTION
Status: DISCONTINUED | OUTPATIENT
Start: 2019-05-31 | End: 2019-05-31 | Stop reason: HOSPADM

## 2019-05-31 RX ORDER — HEPARIN 100 UNIT/ML
5 SYRINGE INTRAVENOUS
Status: CANCELLED | OUTPATIENT
Start: 2019-06-07

## 2019-05-31 RX ORDER — HEPARIN 100 UNIT/ML
5 SYRINGE INTRAVENOUS
Status: CANCELLED | OUTPATIENT
Start: 2019-05-31

## 2019-05-31 RX ORDER — SODIUM CHLORIDE 0.9 % (FLUSH) 0.9 %
10 SYRINGE (ML) INJECTION
Status: CANCELLED | OUTPATIENT
Start: 2019-06-07

## 2019-05-31 NOTE — NURSING
"1000:  Patient arrived to [facility] for #1 injectafer.  Patient reported to RN " I am a very hard stick"  I attempted x 2 to place PIV and Sushil RN attempted x 5.  Patient requested we stop and she would talk with Dr. Lopez regarding possible surgical consult for port placement.  Patient reported she has had a ports in the past but rejected them.  She would like to have visit with a surgeon here and discuss her options now since it has been 5 years ago since the last port and rejection.  Patient did not receive Injectafer today. Patient left [facility] ambulatory by herself and in no distress.-ks  "

## 2019-05-31 NOTE — TELEPHONE ENCOUNTER
Returned pt phone call. She stated that she went for her iron infusion today, but that the infusion nurse was unable to find a vein. Pt stated she was stuck 7 times. She wanted to make Dr. Lopez aware that she was unable to receive infusion.

## 2019-05-31 NOTE — TELEPHONE ENCOUNTER
----- Message from Robert Seals sent at 5/31/2019 11:06 AM CDT -----  Contact: Patient  Type: Needs Medical Advice    Who Called:  Patient  Best Call Back Number: [phone]  Additional Information: Patient states hospital was unable to get IV. Patient states they tried 7 times. Please call to advise. Thanks!

## 2019-05-31 NOTE — TELEPHONE ENCOUNTER
----- Message from Inez Roberts sent at 5/31/2019 12:04 PM CDT -----  Contact: patient  Type:  Patient Returning Call    Who Called:  patient  Who Left Message for Patient:  kelly  Does the patient know what this is regarding?:    Best Call Back Number:  [phone]  Additional Information:

## 2019-06-03 ENCOUNTER — TELEPHONE (OUTPATIENT)
Dept: GASTROENTEROLOGY | Facility: CLINIC | Age: 64
End: 2019-06-03

## 2019-06-03 NOTE — TELEPHONE ENCOUNTER
Left message with pt to make appointment to discuss alternative therapies R/T unable to obtain an IV at the hospital.

## 2019-06-10 ENCOUNTER — OFFICE VISIT (OUTPATIENT)
Dept: GASTROENTEROLOGY | Facility: CLINIC | Age: 64
End: 2019-06-10
Payer: MEDICARE

## 2019-06-10 VITALS
DIASTOLIC BLOOD PRESSURE: 70 MMHG | SYSTOLIC BLOOD PRESSURE: 124 MMHG | HEIGHT: 64 IN | WEIGHT: 164 LBS | HEART RATE: 87 BPM | BODY MASS INDEX: 28 KG/M2 | OXYGEN SATURATION: 96 %

## 2019-06-10 DIAGNOSIS — R11.0 NAUSEA: ICD-10-CM

## 2019-06-10 DIAGNOSIS — K21.9 GASTROESOPHAGEAL REFLUX DISEASE WITHOUT ESOPHAGITIS: ICD-10-CM

## 2019-06-10 DIAGNOSIS — R12 PYROSIS: ICD-10-CM

## 2019-06-10 DIAGNOSIS — R10.13 DYSPEPSIA: ICD-10-CM

## 2019-06-10 DIAGNOSIS — R10.9 CHRONIC ABDOMINAL PAIN: Primary | ICD-10-CM

## 2019-06-10 DIAGNOSIS — G89.29 CHRONIC ABDOMINAL PAIN: Primary | ICD-10-CM

## 2019-06-10 DIAGNOSIS — K86.1 CHRONIC PANCREATITIS, UNSPECIFIED PANCREATITIS TYPE: ICD-10-CM

## 2019-06-10 PROCEDURE — 99999 PR PBB SHADOW E&M-EST. PATIENT-LVL III: ICD-10-PCS | Mod: PBBFAC,,, | Performed by: INTERNAL MEDICINE

## 2019-06-10 PROCEDURE — 99999 PR PBB SHADOW E&M-EST. PATIENT-LVL III: CPT | Mod: PBBFAC,,, | Performed by: INTERNAL MEDICINE

## 2019-06-10 PROCEDURE — 99213 OFFICE O/P EST LOW 20 MIN: CPT | Mod: S$PBB,,, | Performed by: INTERNAL MEDICINE

## 2019-06-10 PROCEDURE — 99213 PR OFFICE/OUTPT VISIT, EST, LEVL III, 20-29 MIN: ICD-10-PCS | Mod: S$PBB,,, | Performed by: INTERNAL MEDICINE

## 2019-06-10 PROCEDURE — 99213 OFFICE O/P EST LOW 20 MIN: CPT | Mod: PBBFAC,PN | Performed by: INTERNAL MEDICINE

## 2019-06-10 RX ORDER — DILTIAZEM HYDROCHLORIDE 120 MG/1
CAPSULE, COATED, EXTENDED RELEASE ORAL
Refills: 4 | COMMUNITY
Start: 2019-05-28 | End: 2021-05-31

## 2019-06-10 NOTE — PROGRESS NOTES
Subjective:       Patient ID: Tessie De La Rosa is a 64 y.o. female.    Chief Complaint: Follow-up    She has had a gastrojejunostomy secondary to duodenal obstruction secondary to chronic pancreatitis.  She has iron deficiency.  Because her duodenum is bypassed I do not believe that she will be able to absorb for adequate iron.  She on the IV iron could not be administered because the nurses could not start an IV.  She will be referred to [id number].  Years ago she had 1 the worked well. She has had nausea without vomiting.  She has had pyrosis and dyspepsia.  She is taking her Dexilant which is controlled 95% of her symptoms.  She does not relate of the symptoms to a specific food.  She has had nausea without vomiting and denies dysphagia aspiration jaundice hematemesis hematochezia or bleeding.  She has had occasional constipation. She takes her medications as prescribed.  She is going to the [facility].  She avoids anti-inflammatory agents.  She is in the nephrologist.      Allergies:  Review of patient's allergies indicates:   Allergen Reactions    Morphine      Muscle spasms, ABD pain    Quinine      Causes thrombocytopenia    Alprazolam     Diazepam     Duloxetine hcl     Pregabalin        Medications:    Current Outpatient Medications:     baclofen (LIORESAL) 10 MG tablet, , Disp: , Rfl: 0    cephALEXin (KEFLEX) 500 MG capsule, , Disp: , Rfl: 5    cyanocobalamin 1,000 mcg/mL injection, , Disp: , Rfl: 6    dexlansoprazole (DEXILANT) 60 mg capsule, Take 1 capsule (60 mg total) by mouth once daily., Disp: 90 capsule, Rfl: 1    diltiaZEM (CARDIZEM CD) 120 MG Cp24, , Disp: , Rfl: 4    ergocalciferol (ERGOCALCIFEROL) 50,000 unit Cap, , Disp: , Rfl: 2    furosemide (LASIX) 20 MG tablet, , Disp: , Rfl: 1    hydrALAZINE (APRESOLINE) 25 MG tablet, , Disp: , Rfl: 5    metFORMIN (GLUCOPHAGE) 500 MG tablet, , Disp: , Rfl: 1    metoprolol succinate (TOPROL-XL) 50 MG 24 hr tablet, , Disp: , Rfl:  1    oxyCODONE (ROXICODONE) 15 MG Tab, , Disp: , Rfl: 0    promethazine (PHENERGAN) 50 MG tablet, Take 1 tablet (50 mg total) by mouth every 6 (six) hours as needed for Nausea., Disp: 100 tablet, Rfl: 1    QUEtiapine (SEROQUEL) 300 MG Tab, , Disp: , Rfl: 11    XARELTO 20 mg Tab, , Disp: , Rfl: 1    Past Medical History:   Diagnosis Date    Acute pancreatitis     Anemia     Chronic constipation     Diverticulitis     Diverticulosis     GERD (gastroesophageal reflux disease)     Irritable bowel syndrome        Past Surgical History:   Procedure Laterality Date    CHOLECYSTECTOMY      COLONOSCOPY      DILATION, ESOPHAGUS N/A 5/21/2019    [facility]    EGD (ESOPHAGOGASTRODUODENOSCOPY) N/A 5/21/2019    [facility] END[facility]    UPPER GASTROINTESTINAL ENDOSCOPY           Review of Systems   Constitutional: Negative for appetite change, fever and unexpected weight change.   HENT: Negative for trouble swallowing.         No jaundice.   Respiratory: Negative for cough, shortness of breath and wheezing.         No Rales, Rhonchi, or Dyspnea.   Cardiovascular: Negative for chest pain.        She denies cardiopulmonary symptoms   Gastrointestinal: Positive for abdominal distention, abdominal pain, constipation, diarrhea, nausea, rectal pain and vomiting. Negative for anal bleeding and blood in stool.        She has a long history of chronic pancreatitis.  It is not of associated without alcohol.  She has other relatives that have had pancreatitis.   Endocrine:        She states her diabetes well controlled.  She has seen the nephrologist for her renal impairment.   Musculoskeletal: Positive for arthralgias and back pain. Negative for neck pain.   Skin: Negative for pallor and rash.   Neurological: Negative for dizziness, seizures, syncope, speech difficulty, weakness and numbness.   Hematological: Negative for adenopathy.   Psychiatric/Behavioral: Negative  for confusion.       Objective:      Physical Exam   Constitutional: She is oriented to person, place, and time.   She is a well-nourished well-hydrated overweight nonicteric white female   HENT:   Head: Normocephalic.   Eyes: Pupils are equal, round, and reactive to light. EOM are normal.   Neck: Normal range of motion. Neck supple. No tracheal deviation present. No thyromegaly present.   Cardiovascular: Normal rate, regular rhythm and normal heart sounds.   Pulmonary/Chest: Effort normal and breath sounds normal.   Abdominal: Soft. Bowel sounds are normal. She exhibits distension. She exhibits no mass. There is tenderness (The abdomen is mildly tender in the epigastrium.  Organomegaly masses are not detected.  Bowel sounds are absent.  There healed surgical scars.). There is no rebound and no guarding.   Musculoskeletal: Normal range of motion.   Lymphadenopathy:     She has no cervical adenopathy.   Neurological: She is alert and oriented to person, place, and time. No cranial nerve deficit.   Skin: Skin is warm and dry.   Psychiatric: She has a normal mood and affect. Her behavior is normal.   Vitals reviewed.        Plan:       Chronic abdominal pain    Chronic pancreatitis, unspecified pancreatitis type    Gastroesophageal reflux disease without esophagitis    Dyspepsia    Pyrosis    Nausea     She will continue her reflux regimen current medications vitamins and minerals.  She will continue her bowel program to avoid constipation.  She follows up with her nephrologist.  She continued and continues to be followed in the Pain Clinic and will continue her usual activity.  For the occasional pyrosis she can use Gaviscon or Mylanta.  She will be referred for Ayfluc-S-Rlcc placement.

## 2019-07-03 RX ORDER — METFORMIN HYDROCHLORIDE 500 MG/1
TABLET ORAL
Qty: 180 TABLET | Refills: 1 | Status: SHIPPED | OUTPATIENT
Start: 2019-07-03 | End: 2020-07-10 | Stop reason: SDUPTHER

## 2019-07-08 ENCOUNTER — TELEPHONE (OUTPATIENT)
Dept: SURGERY | Facility: CLINIC | Age: 64
End: 2019-07-08

## 2019-07-08 ENCOUNTER — OFFICE VISIT (OUTPATIENT)
Dept: GASTROENTEROLOGY | Facility: CLINIC | Age: 64
End: 2019-07-08
Payer: MEDICARE

## 2019-07-08 VITALS
DIASTOLIC BLOOD PRESSURE: 87 MMHG | TEMPERATURE: 98 F | BODY MASS INDEX: 28.34 KG/M2 | SYSTOLIC BLOOD PRESSURE: 157 MMHG | OXYGEN SATURATION: 94 % | HEIGHT: 64 IN | WEIGHT: 166 LBS | HEART RATE: 114 BPM

## 2019-07-08 DIAGNOSIS — K57.30 DIVERTICULOSIS OF LARGE INTESTINE WITHOUT HEMORRHAGE: ICD-10-CM

## 2019-07-08 DIAGNOSIS — F32.A DEPRESSION, UNSPECIFIED DEPRESSION TYPE: ICD-10-CM

## 2019-07-08 DIAGNOSIS — K44.9 HIATAL HERNIA: ICD-10-CM

## 2019-07-08 DIAGNOSIS — K21.9 GASTROESOPHAGEAL REFLUX DISEASE WITHOUT ESOPHAGITIS: ICD-10-CM

## 2019-07-08 DIAGNOSIS — D50.9 IRON DEFICIENCY ANEMIA, UNSPECIFIED IRON DEFICIENCY ANEMIA TYPE: ICD-10-CM

## 2019-07-08 DIAGNOSIS — K59.00 CONSTIPATION, UNSPECIFIED CONSTIPATION TYPE: ICD-10-CM

## 2019-07-08 DIAGNOSIS — K86.1 CHRONIC PANCREATITIS, UNSPECIFIED PANCREATITIS TYPE: Primary | ICD-10-CM

## 2019-07-08 PROCEDURE — 99213 OFFICE O/P EST LOW 20 MIN: CPT | Mod: S$PBB,,, | Performed by: INTERNAL MEDICINE

## 2019-07-08 PROCEDURE — 99213 OFFICE O/P EST LOW 20 MIN: CPT | Mod: PBBFAC,PN | Performed by: INTERNAL MEDICINE

## 2019-07-08 PROCEDURE — 99213 PR OFFICE/OUTPT VISIT, EST, LEVL III, 20-29 MIN: ICD-10-PCS | Mod: S$PBB,,, | Performed by: INTERNAL MEDICINE

## 2019-07-08 PROCEDURE — 99999 PR PBB SHADOW E&M-EST. PATIENT-LVL III: ICD-10-PCS | Mod: PBBFAC,,, | Performed by: INTERNAL MEDICINE

## 2019-07-08 PROCEDURE — 99999 PR PBB SHADOW E&M-EST. PATIENT-LVL III: CPT | Mod: PBBFAC,,, | Performed by: INTERNAL MEDICINE

## 2019-07-08 RX ORDER — ESCITALOPRAM OXALATE 10 MG/1
10 TABLET ORAL DAILY
Qty: 90 TABLET | Refills: 0 | Status: SHIPPED | OUTPATIENT
Start: 2019-07-08 | End: 2019-11-04 | Stop reason: SDUPTHER

## 2019-07-08 RX ORDER — TRIAMCINOLONE ACETONIDE 1 MG/G
CREAM TOPICAL
Refills: 2 | COMMUNITY
Start: 2019-06-26

## 2019-07-08 RX ORDER — DEXLANSOPRAZOLE 60 MG/1
60 CAPSULE, DELAYED RELEASE ORAL DAILY
Qty: 90 CAPSULE | Refills: 1 | Status: SHIPPED | OUTPATIENT
Start: 2019-07-08 | End: 2019-08-19 | Stop reason: SDUPTHER

## 2019-07-08 NOTE — TELEPHONE ENCOUNTER
----- Message from Yaa Betancourt sent at 7/8/2019  9:18 AM CDT -----  Type: Needs Medical Advice    Who Called: Patient   Best Call Back Number: [phone]  Additional Information: patient states she continues to receive call regarding scheduling surgery, she states she is not having surgery and requesting to received no more phone calls about scheduleing and to take her off of the schedule.     Thank you

## 2019-07-08 NOTE — PATIENT INSTRUCTIONS
She will continue her current medications vitamins and minerals.  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  She will see be started on Lexapro for anxiety and stress.  She will continue in the Pain Clinic.  She has started her metformin for the diabetes.  She will continue the Linzess and the Dexilant.  She is having labs obtained and she will send us a copy.

## 2019-07-08 NOTE — TELEPHONE ENCOUNTER
Spoke with patient, patient stated that she does not want to have surgery. Advised patient we received referral from Dr. Lopez, advised that this appointment was for a consult. Patient stated that she does not want to see Dr. Crowley and she has stated this several times, apologized to patient. She stated that she was going to discuss this with Dr. MARVIN Lopez today at her appt.

## 2019-07-08 NOTE — PROGRESS NOTES
Subjective:       Patient ID: Tessie De La Rosa is a 64 y.o. female.    Chief Complaint: Follow-up (1 mo f/u); Nausea; and Emesis    She has iron deficiency.  She has had a Billroth II and cannot absorb the oral iron.  She was scheduled to have intravenous iron but an IV could not be started.  She was referred to the surgeon for [place].  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 in the ADA diet.  She follows up with her nephrologist and states that she was told that she is doing well.  She is going to the Pain Clinic which has controlled the majority of her symptoms.  She is under lot of emotional strain with family members.  In the past the Lexapro has helped her and she would like to have it is restarted.  She has had difficulty sleeping is been somewhat depressed.  She denies fever chills.  She is followed by the dermatologists and has labs scheduled.  She denies cardiopulmonary symptoms fever chills. She denies hematemesis hematochezia or jaundice.      Allergies:  Review of patient's allergies indicates:   Allergen Reactions    Morphine      Muscle spasms, ABD pain    Quinine      Causes thrombocytopenia    Alprazolam     Diazepam     Duloxetine hcl     Pregabalin        Medications:    Current Outpatient Medications:     baclofen (LIORESAL) 10 MG tablet, , Disp: , Rfl: 0    cephALEXin (KEFLEX) 500 MG capsule, , Disp: , Rfl: 5    cyanocobalamin 1,000 mcg/mL injection, , Disp: , Rfl: 6    dexlansoprazole (DEXILANT) 60 mg capsule, Take 1 capsule (60 mg total) by mouth once daily., Disp: 90 capsule, Rfl: 1    diltiaZEM (CARDIZEM CD) 120 MG Cp24, , Disp: , Rfl: 4    ergocalciferol (ERGOCALCIFEROL) 50,000 unit Cap, , Disp: , Rfl: 2    furosemide (LASIX) 20 MG tablet, , Disp: , Rfl: 1    hydrALAZINE (APRESOLINE) 25 MG tablet, , Disp: , Rfl: 5     metFORMIN (GLUCOPHAGE) 500 MG tablet, TAKE ONE TABLET BY MOUTH TWICE DAILY, Disp: 180 tablet, Rfl: 1    metoprolol succinate (TOPROL-XL) 50 MG 24 hr tablet, , Disp: , Rfl: 1    oxyCODONE (ROXICODONE) 15 MG Tab, , Disp: , Rfl: 0    promethazine (PHENERGAN) 50 MG tablet, Take 1 tablet (50 mg total) by mouth every 6 (six) hours as needed for Nausea., Disp: 100 tablet, Rfl: 1    QUEtiapine (SEROQUEL) 300 MG Tab, , Disp: , Rfl: 11    triamcinolone acetonide 0.1% (KENALOG) 0.1 % cream, , Disp: , Rfl: 2    XARELTO 20 mg Tab, , Disp: , Rfl: 1    Past Medical History:   Diagnosis Date    Acute pancreatitis     Anemia     Chronic constipation     Diverticulitis     Diverticulosis     GERD (gastroesophageal reflux disease)     Irritable bowel syndrome        Past Surgical History:   Procedure Laterality Date    CHOLECYSTECTOMY      COLONOSCOPY      DILATION, ESOPHAGUS N/A 5/21/2019    [facility]    EGD (ESOPHAGOGASTRODUODENOSCOPY) N/A 5/21/2019    [facility]    UPPER GASTROINTESTINAL ENDOSCOPY           Review of Systems   Constitutional: Negative for appetite change, fever and unexpected weight change.   HENT: Negative for trouble swallowing.         No jaundice.   Respiratory: Negative for cough, shortness of breath and wheezing.         No Rales, Rhonchi, or Dyspnea.   Cardiovascular: Negative for chest pain.        She states her blood pressure is well controlled.  She has seen the cardiologist.  She denies cardiopulmonary symptoms and is physically active.  She denies alcohol or tobacco usage.   Gastrointestinal: Positive for abdominal distention, abdominal pain, constipation and nausea. Negative for anal bleeding, blood in stool, diarrhea, rectal pain and vomiting.        She has a history of a Billroth II.  She has duodenal obstruction due to chronic pancreatitis.  It is soft the pancreatitis is idiopathic.  She denies jaundice or  bleeding.  She has had dilatation of the anastomosis.  She complains of abdominal distension.  I know what foods to eat  the Dexilant has resolved the pyrosis and dyspepsia.  The Linzess is produced daily bowel movements   Musculoskeletal: Positive for arthralgias, back pain, myalgias and neck stiffness. Negative for neck pain.        She has been treatedin the Pain Clinic.   Skin: Negative for pallor and rash.        She has been followed by the dermatologist for the chronic dermatitis.   Neurological: Negative for dizziness, seizures, syncope, speech difficulty, weakness and numbness.   Hematological: Negative for adenopathy.   Psychiatric/Behavioral: Positive for sleep disturbance. Negative for confusion.       Objective:      Physical Exam   Constitutional: She is oriented to person, place, and time. She appears well-developed and well-nourished.   Well-nourished well-hydrated nonicteric white female   HENT:   Head: Normocephalic.   Eyes: Pupils are equal, round, and reactive to light. EOM are normal.   Neck: Normal range of motion. Neck supple. No tracheal deviation present. No thyromegaly present.   Cardiovascular: Normal rate, regular rhythm and normal heart sounds.   Pulmonary/Chest: Effort normal and breath sounds normal.   Abdominal: Soft. Bowel sounds are normal. She exhibits no distension and no mass. There is tenderness. There is no rebound. No hernia.   Abdomen is soft with healed surgical scars.  There is mild tenderness in the epigastrium.  It is mildly obese.  Bowel sounds are normal.  Masses are absent.   Musculoskeletal: Normal range of motion.   She can ambulate normally.  She can go from the sitting to the standing position without difficulty.   Lymphadenopathy:     She has no cervical adenopathy.   Neurological: She is alert and oriented to person, place, and time. No cranial nerve deficit.   Skin: Skin is warm and dry.   Psychiatric: She has a normal mood and affect. Her behavior is normal.    Vitals reviewed.        Plan:       Chronic pancreatitis, unspecified pancreatitis type    Gastroesophageal reflux disease without esophagitis    Hiatal hernia    Constipation, unspecified constipation type    Diverticulosis of large intestine without hemorrhage    Iron deficiency anemia, unspecified iron deficiency anemia type    Depression, unspecified depression type

## 2019-07-08 NOTE — PROGRESS NOTES
She is scheduled to see the hematologist on August the 06/20/2019.  She will keep me informed.  If she cannot have an IV access place they are she will contact her surgeon.

## 2019-07-15 ENCOUNTER — TELEPHONE (OUTPATIENT)
Dept: GASTROENTEROLOGY | Facility: CLINIC | Age: 64
End: 2019-07-15

## 2019-07-15 NOTE — TELEPHONE ENCOUNTER
----- Message from Asia Jose sent at 7/15/2019  2:44 PM CDT -----  Contact: pt  Calling for lab results that another doctor should have faxed over to you and please advise. [phone] (home)

## 2019-08-19 ENCOUNTER — OFFICE VISIT (OUTPATIENT)
Dept: GASTROENTEROLOGY | Facility: CLINIC | Age: 64
End: 2019-08-19
Payer: MEDICARE

## 2019-08-19 VITALS
HEIGHT: 64 IN | OXYGEN SATURATION: 97 % | DIASTOLIC BLOOD PRESSURE: 74 MMHG | HEART RATE: 60 BPM | BODY MASS INDEX: 27.14 KG/M2 | SYSTOLIC BLOOD PRESSURE: 132 MMHG | WEIGHT: 159 LBS | RESPIRATION RATE: 18 BRPM

## 2019-08-19 DIAGNOSIS — G89.4 CHRONIC PAIN SYNDROME: ICD-10-CM

## 2019-08-19 DIAGNOSIS — K21.00 GASTROESOPHAGEAL REFLUX DISEASE WITH ESOPHAGITIS: ICD-10-CM

## 2019-08-19 DIAGNOSIS — D50.9 IRON DEFICIENCY ANEMIA, UNSPECIFIED IRON DEFICIENCY ANEMIA TYPE: Primary | ICD-10-CM

## 2019-08-19 DIAGNOSIS — K44.9 HIATAL HERNIA: ICD-10-CM

## 2019-08-19 DIAGNOSIS — K57.30 DIVERTICULOSIS OF LARGE INTESTINE WITHOUT HEMORRHAGE: ICD-10-CM

## 2019-08-19 DIAGNOSIS — K86.1 CHRONIC PANCREATITIS, UNSPECIFIED PANCREATITIS TYPE: ICD-10-CM

## 2019-08-19 PROCEDURE — 99999 PR PBB SHADOW E&M-EST. PATIENT-LVL IV: CPT | Mod: PBBFAC,,, | Performed by: INTERNAL MEDICINE

## 2019-08-19 PROCEDURE — 99999 PR PBB SHADOW E&M-EST. PATIENT-LVL IV: ICD-10-PCS | Mod: PBBFAC,,, | Performed by: INTERNAL MEDICINE

## 2019-08-19 PROCEDURE — 99213 OFFICE O/P EST LOW 20 MIN: CPT | Mod: S$PBB,,, | Performed by: INTERNAL MEDICINE

## 2019-08-19 PROCEDURE — 99213 PR OFFICE/OUTPT VISIT, EST, LEVL III, 20-29 MIN: ICD-10-PCS | Mod: S$PBB,,, | Performed by: INTERNAL MEDICINE

## 2019-08-19 PROCEDURE — 99214 OFFICE O/P EST MOD 30 MIN: CPT | Mod: PBBFAC,PN | Performed by: INTERNAL MEDICINE

## 2019-08-19 RX ORDER — DEXLANSOPRAZOLE 60 MG/1
60 CAPSULE, DELAYED RELEASE ORAL DAILY
Qty: 30 CAPSULE | Refills: 11 | Status: SHIPPED | OUTPATIENT
Start: 2019-08-19 | End: 2019-12-19 | Stop reason: SDUPTHER

## 2019-08-19 RX ORDER — ALBUTEROL SULFATE 90 UG/1
AEROSOL, METERED RESPIRATORY (INHALATION)
Refills: 1 | COMMUNITY
Start: 2019-08-09 | End: 2021-04-05

## 2019-08-19 RX ORDER — DICLOFENAC SODIUM 10 MG/G
GEL TOPICAL
Refills: 0 | COMMUNITY
Start: 2019-07-24 | End: 2021-04-05 | Stop reason: ALTCHOICE

## 2019-08-19 NOTE — PROGRESS NOTES
"Subjective:       Patient ID: Tessie De La Rosa is a 64 y.o. female.    Chief Complaint: Follow-up    The hematologist told her that she has iron deficiency and will arrange to IV administration.  She has had a gastric bypass in will have difficulty absorbing the oral iron.  She denies hematemesis hematochezia jaundice.  In the past she has had IV iron.  She is apprehensive about a port because in the past the ports have become infected and she believes that she may be a MRSA  carrier.  She is on metformin b.i.d. for the chronic pancreatitis and diabetes.  The diabetes may be a component of the chronic pancreatitis.  She is going to the [facility] Clinic and is on oxycodone.  She avoids anti-inflammatory agents.  The Dexilant has resolved her upper GI symptoms.  She denies dysphagia jaundice or bleeding.  She generally has daily bowel movements.  She denies fever chills. Because of the chronic pancreatitis duodenal obstruction she has had a gastrojejunostomy.  In the past she has needed dilatation of the anastomosis.  She has occasional nausea but she denies vomiting or abdominal distension.  o she denies fever chills and her weight has remained stable. She picks and she uses her food in that she can tolerate.  ".      Allergies:  Review of patient's allergies indicates:   Allergen Reactions    Morphine      Muscle spasms, ABD pain    Quinine      Causes thrombocytopenia    Alprazolam     Diazepam     Duloxetine hcl     Pregabalin        Medications:    Current Outpatient Medications:     baclofen (LIORESAL) 10 MG tablet, , Disp: , Rfl: 0    cephALEXin (KEFLEX) 500 MG capsule, , Disp: , Rfl: 5    cyanocobalamin 1,000 mcg/mL injection, , Disp: , Rfl: 6    diclofenac sodium (VOLTAREN) 1 % Gel, , Disp: , Rfl: 0    diltiaZEM (CARDIZEM CD) 120 MG Cp24, , Disp: , Rfl: 4    ergocalciferol (ERGOCALCIFEROL) 50,000 unit Cap, , Disp: , Rfl: 2    escitalopram oxalate (LEXAPRO) 10 MG tablet, Take 1 tablet (10 mg " total) by mouth once daily., Disp: 90 tablet, Rfl: 0    furosemide (LASIX) 20 MG tablet, , Disp: , Rfl: 1    hydrALAZINE (APRESOLINE) 25 MG tablet, , Disp: , Rfl: 5    linaCLOtide (LINZESS) 145 mcg Cap capsule, Take 1 capsule (145 mcg total) by mouth once daily., Disp: 90 capsule, Rfl: 0    metFORMIN (GLUCOPHAGE) 500 MG tablet, TAKE ONE TABLET BY MOUTH TWICE DAILY, Disp: 180 tablet, Rfl: 1    metoprolol succinate (TOPROL-XL) 50 MG 24 hr tablet, , Disp: , Rfl: 1    oxyCODONE (ROXICODONE) 15 MG Tab, , Disp: , Rfl: 0    promethazine (PHENERGAN) 50 MG tablet, Take 1 tablet (50 mg total) by mouth every 6 (six) hours as needed for Nausea., Disp: 100 tablet, Rfl: 1    QUEtiapine (SEROQUEL) 300 MG Tab, , Disp: , Rfl: 11    triamcinolone acetonide 0.1% (KENALOG) 0.1 % cream, , Disp: , Rfl: 2    VENTOLIN HFA 90 mcg/actuation inhaler, , Disp: , Rfl: 1    XARELTO 20 mg Tab, , Disp: , Rfl: 1    dexlansoprazole (DEXILANT) 60 mg capsule, Take 1 capsule (60 mg total) by mouth once daily., Disp: 30 capsule, Rfl: 11    Past Medical History:   Diagnosis Date    Acute pancreatitis     Anemia     Chronic constipation     Diverticulitis     Diverticulosis     GERD (gastroesophageal reflux disease)     Irritable bowel syndrome        Past Surgical History:   Procedure Laterality Date    CHOLECYSTECTOMY      COLONOSCOPY      DILATION, ESOPHAGUS N/A 5/21/2019    Performed [facility] ENDO    EGD (ESOPHAGOGASTRODUODENOSCOPY) N/A 5/21/2019    Per[facility] at [facility] ENDO    UPPER GASTROINTESTINAL ENDOSCOPY           Review of Systems   Constitutional: Negative for appetite change, fever and unexpected weight change.   HENT: Negative for trouble swallowing.         No jaundice.   Respiratory: Negative for cough, shortness of breath and wheezing.         She denies tobacco alcohol.  She denies cardiopulmonary symptoms but is followed by the cardiologist.  She is not particularly physically  active.   Cardiovascular: Negative for chest pain.   Gastrointestinal: Positive for nausea. Negative for abdominal pain, anal bleeding, blood in stool, constipation and diarrhea.        She has history of hiatal hernia gastroesophageal reflux but the Dexilant has resolved her upper GI symptoms.  She denies dysphagia aspiration.  She has a history of diverticulosis but generally has daily bowel movements with her stool softeners.  She gives a self her medications including the B12.  She has had nausea but she denies vomiting.   Musculoskeletal: Positive for arthralgias and back pain. Negative for neck pain.        She fell on her right knee.  She has a brace.  On the knee.  She has a history of arthritis and has had multiple injections in the knees.  She is followed by her orthopedist.  She does have back pain.   Skin: Negative for pallor and rash.        She does see a dermatologist for her skin lesions.  She states that she is in remission.   Neurological: Negative for dizziness, seizures, syncope, speech difficulty, weakness and numbness.   Hematological: Negative for adenopathy.   Psychiatric/Behavioral: Negative for confusion.       Objective:      Physical Exam   Constitutional: She is oriented to person, place, and time. She appears well-developed and well-nourished.   Well-nourished well-hydrated nonicteric overweight white female.   HENT:   Head: Normocephalic.   Eyes: Pupils are equal, round, and reactive to light. EOM are normal.   Neck: Normal range of motion. Neck supple. No tracheal deviation present. No thyromegaly present.   Cardiovascular: Normal rate, regular rhythm and normal heart sounds.   Pulmonary/Chest: Effort normal and breath sounds normal.   Abdominal: Soft. Bowel sounds are normal. She exhibits no distension and no mass. There is tenderness. There is no rebound and no guarding. No hernia.   Abdomen is soft with mildly obese with mild tenderness in the epigastrium.  There healed scars.   Organomegaly masses are not detected.  Bowel sounds are normal.   Musculoskeletal: Normal range of motion.   She ambulates well.  She has a brace on the right knee.  She can go from the sitting to the standing position without difficulty.   Lymphadenopathy:     She has no cervical adenopathy.   Neurological: She is alert and oriented to person, place, and time. No cranial nerve deficit.   Skin: Skin is warm and dry.   Psychiatric: She has a normal mood and affect. Her behavior is normal.   Vitals reviewed.        Plan:       Iron deficiency anemia, unspecified iron deficiency anemia type    Gastroesophageal reflux disease with esophagitis    Hiatal hernia    Diverticulosis of large intestine without hemorrhage    Chronic pancreatitis, unspecified pancreatitis type    Chronic pain syndrome    Other orders  -     dexlansoprazole (DEXILANT) 60 mg capsule; Take 1 capsule (60 mg total) by mouth once daily.  Dispense: 30 capsule; Refill: 11

## 2019-08-19 NOTE — PATIENT INSTRUCTIONS
She will follow-up with her hematologist and let her arrange the IV iron.  Because of your gastric bypass you will not absorb the oral iron adequately.  Additionally he will at the hematologist obtain an endocrinologist in the [facility] for you 4 year diabetes.  He will continue the Pain Clinic.  You continue your current medications vitamins and minerals.  Four year knees year followed by the orthopedist.

## 2019-08-21 ENCOUNTER — TELEPHONE (OUTPATIENT)
Dept: GASTROENTEROLOGY | Facility: CLINIC | Age: 64
End: 2019-08-21

## 2019-08-28 RX ORDER — CYANOCOBALAMIN 1000 UG/ML
INJECTION, SOLUTION INTRAMUSCULAR; SUBCUTANEOUS
Qty: 1 ML | Refills: 6 | Status: SHIPPED | OUTPATIENT
Start: 2019-08-28 | End: 2020-03-11 | Stop reason: SDUPTHER

## 2019-08-28 RX ORDER — LINACLOTIDE 145 UG/1
CAPSULE, GELATIN COATED ORAL
Qty: 90 CAPSULE | Refills: 0 | Status: SHIPPED | OUTPATIENT
Start: 2019-08-28 | End: 2019-12-19 | Stop reason: SDUPTHER

## 2019-08-28 RX ORDER — PROMETHAZINE HYDROCHLORIDE 50 MG/1
TABLET ORAL
Qty: 100 TABLET | Refills: 1 | Status: SHIPPED | OUTPATIENT
Start: 2019-08-28 | End: 2020-02-03 | Stop reason: SDUPTHER

## 2019-09-19 ENCOUNTER — OFFICE VISIT (OUTPATIENT)
Dept: GASTROENTEROLOGY | Facility: CLINIC | Age: 64
End: 2019-09-19
Payer: MEDICARE

## 2019-09-19 ENCOUNTER — TELEPHONE (OUTPATIENT)
Dept: GASTROENTEROLOGY | Facility: CLINIC | Age: 64
End: 2019-09-19

## 2019-09-19 VITALS
DIASTOLIC BLOOD PRESSURE: 73 MMHG | RESPIRATION RATE: 16 BRPM | BODY MASS INDEX: 26.98 KG/M2 | OXYGEN SATURATION: 97 % | SYSTOLIC BLOOD PRESSURE: 140 MMHG | HEART RATE: 60 BPM | WEIGHT: 158 LBS | HEIGHT: 64 IN

## 2019-09-19 DIAGNOSIS — K57.30 DIVERTICULOSIS OF LARGE INTESTINE WITHOUT HEMORRHAGE: ICD-10-CM

## 2019-09-19 DIAGNOSIS — K21.9 GASTROESOPHAGEAL REFLUX DISEASE WITHOUT ESOPHAGITIS: ICD-10-CM

## 2019-09-19 DIAGNOSIS — K86.1 IDIOPATHIC CHRONIC PANCREATITIS: Primary | ICD-10-CM

## 2019-09-19 PROCEDURE — 99999 PR PBB SHADOW E&M-EST. PATIENT-LVL III: ICD-10-PCS | Mod: PBBFAC,,, | Performed by: INTERNAL MEDICINE

## 2019-09-19 PROCEDURE — 99213 OFFICE O/P EST LOW 20 MIN: CPT | Mod: S$PBB,,, | Performed by: INTERNAL MEDICINE

## 2019-09-19 PROCEDURE — 99213 OFFICE O/P EST LOW 20 MIN: CPT | Mod: PBBFAC,PN | Performed by: INTERNAL MEDICINE

## 2019-09-19 PROCEDURE — 99999 PR PBB SHADOW E&M-EST. PATIENT-LVL III: CPT | Mod: PBBFAC,,, | Performed by: INTERNAL MEDICINE

## 2019-09-19 PROCEDURE — 99213 PR OFFICE/OUTPT VISIT, EST, LEVL III, 20-29 MIN: ICD-10-PCS | Mod: S$PBB,,, | Performed by: INTERNAL MEDICINE

## 2019-09-19 RX ORDER — CEPHALEXIN 250 MG/1
CAPSULE ORAL
Refills: 11 | COMMUNITY
Start: 2019-08-27 | End: 2020-07-06

## 2019-09-19 RX ORDER — ACETAMINOPHEN 500 MG
TABLET ORAL
Refills: 1 | COMMUNITY
Start: 2019-08-23 | End: 2019-09-19

## 2019-09-19 NOTE — PATIENT INSTRUCTIONS
She will continue current medications.  She will continue with the hematologist.  Her nurse practitioner is regulating her diabetes and the labs are being obtained at the local hospital.  She will make sure I get a copy the records.  She is going to see a doctor for hormonal evaluation will keep us informed.  She will continue current medications at and vitamins and minerals although she avoids oral iron.  She will have dilatation of her gastrojejunostomy as needed.

## 2019-09-19 NOTE — TELEPHONE ENCOUNTER
Pt stated today during office visit that she has a CBC obtained monthly at [facility]. This lab must be requested monthly.

## 2019-09-20 NOTE — PROGRESS NOTES
Subjective:       Patient ID: Tessie De La Rosa is a 64 y.o. female.    Chief Complaint: Follow-up    She has chronic pancreatitis and has had a gastrojejunostomy because of duodenal obstruction.  In the past the anastomosis has been dilated.  She has her usual abdominal distension and nausea but she denies vomiting hematemesis hematochezia dysphagia aspiration.  She generally has a bowel movement every couple of days.  She states that the hematologist was unable to obtain a IV therefore she did not obtain IV iron.  She is intolerant to the oral iron which is caused abdominal pain nausea and vomiting. She states they are going to follow me by a CBC monthly  .  She denies hematemesis hematochezia jaundice or bleeding.  She has seen the dermatologist and her skin lesions are in remission.  She is followed by her cardiologist.  Neurologically she is doing well. She has a remote history of multiple sclerosis but she denies neurologic symptoms.  Her  states she is thinking clearly.  She can perform her usual activities without difficulty. She denies fever chills or cardiopulmonary symptoms.  She does not use tobacco or alcohol.      Allergies:  Review of patient's allergies indicates:   Allergen Reactions    Morphine      Muscle spasms, ABD pain    Quinine      Causes thrombocytopenia    Alprazolam     Diazepam     Duloxetine hcl     Pregabalin        Medications:    Current Outpatient Medications:     baclofen (LIORESAL) 10 MG tablet, , Disp: , Rfl: 0    cephALEXin (KEFLEX) 250 MG capsule, , Disp: , Rfl: 11    cyanocobalamin 1,000 mcg/mL injection, INJECT 1 ML EVERY MONTH BY INTRAMUSCULAR ROUTE AS DIRECTED, Disp: 1 mL, Rfl: 6    dexlansoprazole (DEXILANT) 60 mg capsule, Take 1 capsule (60 mg total) by mouth once daily., Disp: 30 capsule, Rfl: 11    diclofenac sodium (VOLTAREN) 1 % Gel, , Disp: , Rfl: 0    diltiaZEM (CARDIZEM CD) 120 MG Cp24, , Disp: , Rfl: 4    ergocalciferol (ERGOCALCIFEROL)  50,000 unit Cap, , Disp: , Rfl: 2    escitalopram oxalate (LEXAPRO) 10 MG tablet, Take 1 tablet (10 mg total) by mouth once daily., Disp: 90 tablet, Rfl: 0    furosemide (LASIX) 20 MG tablet, , Disp: , Rfl: 1    hydrALAZINE (APRESOLINE) 25 MG tablet, , Disp: , Rfl: 5    LINZESS 145 mcg Cap capsule, TAKE ONE CAPSULE BY MOUTH EVERY DAY, Disp: 90 capsule, Rfl: 0    metFORMIN (GLUCOPHAGE) 500 MG tablet, TAKE ONE TABLET BY MOUTH TWICE DAILY, Disp: 180 tablet, Rfl: 1    metoprolol succinate (TOPROL-XL) 50 MG 24 hr tablet, , Disp: , Rfl: 1    oxyCODONE (ROXICODONE) 15 MG Tab, , Disp: , Rfl: 0    promethazine (PHENERGAN) 50 MG tablet, TAKE ONE TABLET BY MOUTH EVERY 6 HOURS AS NEEDED FOR NAUSEA, Disp: 100 tablet, Rfl: 1    QUEtiapine (SEROQUEL) 300 MG Tab, , Disp: , Rfl: 11    triamcinolone acetonide 0.1% (KENALOG) 0.1 % cream, , Disp: , Rfl: 2    VENTOLIN HFA 90 mcg/actuation inhaler, , Disp: , Rfl: 1    XARELTO 20 mg Tab, , Disp: , Rfl: 1    Past Medical History:   Diagnosis Date    Acute pancreatitis     Anemia     Chronic constipation     Diverticulitis     Diverticulosis     GERD (gastroesophageal reflux disease)     Irritable bowel syndrome        Past Surgical History:   Procedure Laterality Date    CHOLECYSTECTOMY      COLONOSCOPY      DILATION, ESOPHAGUS N/A 5/21/2019    [facility]    EGD (ESOPHAGOGASTRODUODENOSCOPY) N/A 5/21/2019    [facility] END[facility]    UPPER GASTROINTESTINAL ENDOSCOPY           Review of Systems   Constitutional: Negative for appetite change, fever and unexpected weight change.   HENT: Negative for trouble swallowing.         No jaundice.   Respiratory: Negative for cough, shortness of breath and wheezing.         No Rales, Rhonchi, or Dyspnea.   Cardiovascular: Negative for chest pain.   Gastrointestinal: Positive for abdominal distention and constipation. Negative for abdominal pain, anal bleeding, blood in stool,  diarrhea, nausea, rectal pain and vomiting.        She has history hemorrhoids and diverticulosis.  He uses occasional MiraLax.  She continues her stool softener she states.  She has chronic pancreatitis.  She denies jaundice or bleeding.  She tries to eat a nutritious diet.  She avoids the anti-inflammatory agents.  The Pain Clinic is controlling her pain and symptoms.  She gives herself the B12 and she is taking her vitamins and minerals.   Endocrine:        She states her diabetes is well controlled.  I have requested the labs.   Genitourinary:        She has chronic renal disease and is seen the nephrologist.  I have requested the records.  She denies she symptoms.   Musculoskeletal: Positive for arthralgias and back pain. Negative for neck pain.        She is going to the Pain Clinic which is controlled the majority of her symptoms.  She avoids the anti-inflammatory agents because they cause gastrointestinal upset.   Skin: Negative for pallor and rash.   Neurological: Negative for dizziness, seizures, syncope, speech difficulty, weakness and numbness.   Hematological: Negative for adenopathy.   Psychiatric/Behavioral: Negative for confusion.       Objective:      Physical Exam   Constitutional: She is oriented to person, place, and time. She appears well-developed and well-nourished.   Well-nourished well-hydrated nonicteric white female   HENT:   Head: Normocephalic.   Eyes: Pupils are equal, round, and reactive to light. EOM are normal.   Neck: Normal range of motion. Neck supple. No tracheal deviation present. No thyromegaly present.   Cardiovascular: Normal rate, regular rhythm and normal heart sounds.   Pulmonary/Chest: Effort normal and breath sounds normal.   Abdominal: Soft. Bowel sounds are normal. She exhibits no distension and no mass. There is no tenderness. There is no rebound and no guarding.   Musculoskeletal: Normal range of motion.   She can ambulate normally.  She can go from the sitting to  the standing position without difficulty.   Lymphadenopathy:     She has no cervical adenopathy.   Neurological: She is alert and oriented to person, place, and time. No cranial nerve deficit.   Skin: Skin is warm and dry.   Psychiatric: She has a normal mood and affect. Her behavior is normal.   Vitals reviewed.        Plan:       There are no diagnoses linked to this encounter.

## 2019-09-20 NOTE — PROGRESS NOTES
She will continue her reflux regimen vitamins and minerals.  She will be dilated as needed.  She follows up in the Pain Clinic.  She follows up with her other physicians.  She will regulate her diet and avoid the off ending foods.  She continues her bowel program to avoid constipation.  I have requested her records.

## 2019-11-05 RX ORDER — ESCITALOPRAM OXALATE 10 MG/1
TABLET ORAL
Qty: 90 TABLET | Refills: 0 | Status: SHIPPED | OUTPATIENT
Start: 2019-11-05 | End: 2020-03-11 | Stop reason: SDUPTHER

## 2019-12-19 ENCOUNTER — OFFICE VISIT (OUTPATIENT)
Dept: GASTROENTEROLOGY | Facility: CLINIC | Age: 64
End: 2019-12-19
Payer: MEDICARE

## 2019-12-19 VITALS
RESPIRATION RATE: 16 BRPM | OXYGEN SATURATION: 97 % | BODY MASS INDEX: 26.29 KG/M2 | SYSTOLIC BLOOD PRESSURE: 155 MMHG | HEIGHT: 64 IN | WEIGHT: 154 LBS | HEART RATE: 96 BPM | DIASTOLIC BLOOD PRESSURE: 87 MMHG

## 2019-12-19 DIAGNOSIS — K21.9 GASTROESOPHAGEAL REFLUX DISEASE WITHOUT ESOPHAGITIS: ICD-10-CM

## 2019-12-19 DIAGNOSIS — K44.9 HIATAL HERNIA: ICD-10-CM

## 2019-12-19 DIAGNOSIS — K21.9 GASTROESOPHAGEAL REFLUX DISEASE, ESOPHAGITIS PRESENCE NOT SPECIFIED: Primary | ICD-10-CM

## 2019-12-19 DIAGNOSIS — K58.1 IRRITABLE BOWEL SYNDROME WITH CONSTIPATION: ICD-10-CM

## 2019-12-19 DIAGNOSIS — K56.699 ANASTOMOTIC STENOSIS OF GASTROJEJUNOSTOMY: ICD-10-CM

## 2019-12-19 DIAGNOSIS — K59.00 CONSTIPATION, UNSPECIFIED CONSTIPATION TYPE: ICD-10-CM

## 2019-12-19 DIAGNOSIS — K86.1 IDIOPATHIC CHRONIC PANCREATITIS: ICD-10-CM

## 2019-12-19 DIAGNOSIS — K57.30 DIVERTICULOSIS OF LARGE INTESTINE WITHOUT HEMORRHAGE: ICD-10-CM

## 2019-12-19 PROCEDURE — 99214 OFFICE O/P EST MOD 30 MIN: CPT | Mod: PBBFAC,PN | Performed by: INTERNAL MEDICINE

## 2019-12-19 PROCEDURE — 99213 OFFICE O/P EST LOW 20 MIN: CPT | Mod: S$PBB,,, | Performed by: INTERNAL MEDICINE

## 2019-12-19 PROCEDURE — 99213 PR OFFICE/OUTPT VISIT, EST, LEVL III, 20-29 MIN: ICD-10-PCS | Mod: S$PBB,,, | Performed by: INTERNAL MEDICINE

## 2019-12-19 PROCEDURE — 99999 PR PBB SHADOW E&M-EST. PATIENT-LVL IV: ICD-10-PCS | Mod: PBBFAC,,, | Performed by: INTERNAL MEDICINE

## 2019-12-19 PROCEDURE — 99999 PR PBB SHADOW E&M-EST. PATIENT-LVL IV: CPT | Mod: PBBFAC,,, | Performed by: INTERNAL MEDICINE

## 2019-12-19 RX ORDER — ESTROGENS, CONJUGATED 0.62 MG/1
TABLET, FILM COATED ORAL
Refills: 1 | COMMUNITY
Start: 2019-10-17 | End: 2021-04-05

## 2019-12-19 RX ORDER — TIZANIDINE 4 MG/1
TABLET ORAL
COMMUNITY
Start: 2019-12-18

## 2019-12-19 RX ORDER — ACETAMINOPHEN 500 MG
TABLET ORAL
COMMUNITY
Start: 2019-11-05 | End: 2021-04-05 | Stop reason: ALTCHOICE

## 2019-12-19 RX ORDER — DEXLANSOPRAZOLE 60 MG/1
60 CAPSULE, DELAYED RELEASE ORAL DAILY
Qty: 30 CAPSULE | Refills: 11 | Status: SHIPPED | OUTPATIENT
Start: 2019-12-19 | End: 2020-03-12 | Stop reason: SDUPTHER

## 2019-12-19 RX ORDER — OXYCODONE HYDROCHLORIDE 10 MG/1
TABLET ORAL
COMMUNITY
Start: 2019-12-18

## 2019-12-19 NOTE — PROGRESS NOTES
Subjective:       Patient ID: Tessie De La Rosa is a 64 y.o. female.    Chief Complaint: Follow-up    She has occasional vomiting  She has a history of a gastrojejunostomy for duodenal obstruction and chronic pancreatitis.  She has a family history of pancreatitis.  She does not use tobacco alcohol..  She has history of gastritis hiatal hernia gastroesophageal reflux.  She has a history of diverticulosis hemorrhoids and constipation. The Dexilant has helped her upper GI symptoms.  The Linzess is helped with the constipation.  She is followed by the nephrologist for diabetes and stage III renal disease.  She states that she is stable. Her diabetes is well controlled she states.  She goes to the Pain Clinic.  She denies fever chills. She denies hematemesis hematochezia dysphagia aspiration jaundice or bleeding.      Allergies:  Review of patient's allergies indicates:   Allergen Reactions    Morphine      Muscle spasms, ABD pain    Quinine      Causes thrombocytopenia    Alprazolam     Diazepam     Duloxetine hcl     Pregabalin        Medications:    Current Outpatient Medications:     baclofen (LIORESAL) 10 MG tablet, , Disp: , Rfl: 0    cephALEXin (KEFLEX) 250 MG capsule, , Disp: , Rfl: 11    cholecalciferol, vitamin D3, 125 mcg (5,000 unit) Tab, , Disp: , Rfl:     cyanocobalamin 1,000 mcg/mL injection, INJECT 1 ML EVERY MONTH BY INTRAMUSCULAR ROUTE AS DIRECTED, Disp: 1 mL, Rfl: 6    dexlansoprazole (DEXILANT) 60 mg capsule, Take 1 capsule (60 mg total) by mouth once daily., Disp: 30 capsule, Rfl: 11    diclofenac sodium (VOLTAREN) 1 % Gel, , Disp: , Rfl: 0    diltiaZEM (CARDIZEM CD) 120 MG Cp24, , Disp: , Rfl: 4    ergocalciferol (ERGOCALCIFEROL) 50,000 unit Cap, , Disp: , Rfl: 2    escitalopram oxalate (LEXAPRO) 10 MG tablet, TAKE ONE TABLET BY MOUTH EVERY DAY, Disp: 90 tablet, Rfl: 0    furosemide (LASIX) 20 MG tablet, , Disp: , Rfl: 1    hydrALAZINE (APRESOLINE) 25 MG tablet, , Disp: ,  Rfl: 5    linaCLOtide (LINZESS) 145 mcg Cap capsule, Take 1 capsule (145 mcg total) by mouth once daily., Disp: 30 capsule, Rfl: 2    metFORMIN (GLUCOPHAGE) 500 MG tablet, TAKE ONE TABLET BY MOUTH TWICE DAILY, Disp: 180 tablet, Rfl: 1    metoprolol succinate (TOPROL-XL) 50 MG 24 hr tablet, , Disp: , Rfl: 1    oxyCODONE (ROXICODONE) 10 mg Tab immediate release tablet, , Disp: , Rfl:     PREMARIN 0.625 mg tablet, , Disp: , Rfl: 1    promethazine (PHENERGAN) 50 MG tablet, TAKE ONE TABLET BY MOUTH EVERY 6 HOURS AS NEEDED FOR NAUSEA, Disp: 100 tablet, Rfl: 1    QUEtiapine (SEROQUEL) 300 MG Tab, , Disp: , Rfl: 11    tiZANidine (ZANAFLEX) 4 MG tablet, , Disp: , Rfl:     triamcinolone acetonide 0.1% (KENALOG) 0.1 % cream, , Disp: , Rfl: 2    VENTOLIN HFA 90 mcg/actuation inhaler, , Disp: , Rfl: 1    XARELTO 20 mg Tab, , Disp: , Rfl: 1    Past Medical History:   Diagnosis Date    Acute pancreatitis     Anemia     Chronic constipation     Diverticulitis     Diverticulosis     GERD (gastroesophageal reflux disease)     Irritable bowel syndrome        Past Surgical History:   Procedure Laterality Date    CHOLECYSTECTOMY      COLONOSCOPY      ESOPHAGEAL DILATION N/A 5/21/2019    Procedure: DILATION, ESOPHAGUS;  Surgeon: Kareem Lopez MD;  Location: [facility];  Service: Endoscopy;  Laterality: N/A;    ESOPHAGOGASTRODUODENOSCOPY N/A 5/21/2019    Procedure: EGD (ESOPHAGOGASTRODUODENOSCOPY);  Surgeon: Kareem Lopez MD;  Location: [facility];  Service: Endoscopy;  Laterality: N/A;    UPPER GASTROINTESTINAL ENDOSCOPY           Review of Systems   Constitutional: Negative for appetite change, fever and unexpected weight change.   HENT: Negative for trouble swallowing.         No jaundice.   Respiratory: Negative for cough, shortness of breath and wheezing.         She has never used tobacco alcohol.  She is physically active and denies dyspnea on exertion.  She denies aspiration hemoptysis chronic cough or  chronic sputum production.   Cardiovascular: Negative for chest pain.        She denies exertional chest pain or rhythm disturbance.  At   Gastrointestinal: Positive for abdominal pain, constipation and nausea. Negative for abdominal distention, anal bleeding, blood in stool, diarrhea and rectal pain.        She has frequent nausea. She has undergone dilatation of the narrowed gastrojejunostomy.  She does not want further surgery.  She states that she will let me know when she needs to be dilated.  In general she can regulate her diet and avoid the foods that upset her up.  She has reduced her weight by reducing her caloric intake.  Her sister and niece both have pancreatitis.   Musculoskeletal: Positive for arthralgias and back pain. Negative for neck pain.        She is followed in the Pain Clinic.  She does not ingest anti-inflammatory agents.   Skin: Negative for pallor and rash.   Neurological: Negative for dizziness, seizures, syncope, speech difficulty, weakness and numbness.   Hematological: Negative for adenopathy.        She has a history of iron deficiency although she states that her recent labs by her nephrologist indicated issues not anemic.  She cannot tolerate oral iron.  Because of difficulty with IV access she has not received iron supplementation.  The hematologist is regulating her hemoglobin and iron status.   Psychiatric/Behavioral: Negative for confusion.       Objective:      Physical Exam   Constitutional: She is oriented to person, place, and time. She appears well-developed and well-nourished.   Well-nourished well-hydrated thin nonicteric white female.   HENT:   Head: Normocephalic.   Eyes: Pupils are equal, round, and reactive to light. EOM are normal.   Neck: Normal range of motion. Neck supple. No tracheal deviation present. No thyromegaly present.   Cardiovascular: Normal rate, regular rhythm and normal heart sounds.   Pulmonary/Chest: Effort normal and breath sounds normal.    Abdominal: Soft. Bowel sounds are normal. She exhibits no distension and no mass. There is tenderness. There is no rebound and no guarding. No hernia.   The abdomen is soft with healed scars without masses organomegaly.  Bowel sounds are normal.   Musculoskeletal: Normal range of motion.   She can ambulate normally.  She can go from the sitting to the standing position without difficulty.   Lymphadenopathy:     She has no cervical adenopathy.   Neurological: She is alert and oriented to person, place, and time. No cranial nerve deficit.   Skin: Skin is warm and dry.   Psychiatric: She has a normal mood and affect. Her behavior is normal.   Vitals reviewed.        Plan:       Gastroesophageal reflux disease, esophagitis presence not specified  -     dexlansoprazole (DEXILANT) 60 mg capsule; Take 1 capsule (60 mg total) by mouth once daily.  Dispense: 30 capsule; Refill: 11    Irritable bowel syndrome with constipation  -     linaCLOtide (LINZESS) 145 mcg Cap capsule; Take 1 capsule (145 mcg total) by mouth once daily.  Dispense: 30 capsule; Refill: 2    Idiopathic chronic pancreatitis    Diverticulosis of large intestine without hemorrhage    Hiatal hernia    Gastroesophageal reflux disease without esophagitis    Anastomotic stenosis of gastrojejunostomy    Constipation, unspecified constipation type     She will continue her reflux regimen current vitamins minerals.  She follows up with the nephrologist.  She will continue the ADA diet.  She continues her evaluation in the Pain Clinic.  She takes her medications as prescribed.  She will continue her usual activities.  She will inform when she needs to be dilated.  At this point her upper GI symptoms are infrequent

## 2019-12-19 NOTE — PATIENT INSTRUCTIONS
She has a history of the narrowed gastrojejunostomy.  She is only having occasional vomiting let me know which needs to be dilated.  She will follow-up with the nephrologist.  She continues her diabetic diet.  She continues with the pain clinic vitamins minerals and supplements.  I have requested her labs.

## 2020-02-03 ENCOUNTER — TELEPHONE (OUTPATIENT)
Dept: GASTROENTEROLOGY | Facility: CLINIC | Age: 65
End: 2020-02-03

## 2020-02-03 ENCOUNTER — OFFICE VISIT (OUTPATIENT)
Dept: GASTROENTEROLOGY | Facility: CLINIC | Age: 65
End: 2020-02-03
Payer: MEDICARE

## 2020-02-03 ENCOUNTER — LAB VISIT (OUTPATIENT)
Dept: LAB | Facility: HOSPITAL | Age: 65
End: 2020-02-03
Attending: INTERNAL MEDICINE
Payer: MEDICARE

## 2020-02-03 VITALS
WEIGHT: 150 LBS | OXYGEN SATURATION: 97 % | HEART RATE: 115 BPM | HEIGHT: 64 IN | SYSTOLIC BLOOD PRESSURE: 181 MMHG | DIASTOLIC BLOOD PRESSURE: 107 MMHG | BODY MASS INDEX: 25.61 KG/M2 | RESPIRATION RATE: 18 BRPM

## 2020-02-03 DIAGNOSIS — K44.9 HIATAL HERNIA: ICD-10-CM

## 2020-02-03 DIAGNOSIS — K22.2 ESOPHAGEAL STRICTURE: Primary | ICD-10-CM

## 2020-02-03 DIAGNOSIS — R10.9 ABDOMINAL PAIN, UNSPECIFIED ABDOMINAL LOCATION: Primary | ICD-10-CM

## 2020-02-03 DIAGNOSIS — R10.9 ABDOMINAL PAIN, UNSPECIFIED ABDOMINAL LOCATION: ICD-10-CM

## 2020-02-03 DIAGNOSIS — K57.30 DIVERTICULOSIS OF LARGE INTESTINE WITHOUT HEMORRHAGE: ICD-10-CM

## 2020-02-03 DIAGNOSIS — K86.1 IDIOPATHIC CHRONIC PANCREATITIS: ICD-10-CM

## 2020-02-03 DIAGNOSIS — D50.9 IRON DEFICIENCY ANEMIA, UNSPECIFIED IRON DEFICIENCY ANEMIA TYPE: ICD-10-CM

## 2020-02-03 DIAGNOSIS — R11.0 NAUSEA: ICD-10-CM

## 2020-02-03 DIAGNOSIS — K21.9 GASTROESOPHAGEAL REFLUX DISEASE, ESOPHAGITIS PRESENCE NOT SPECIFIED: ICD-10-CM

## 2020-02-03 LAB
ALBUMIN SERPL BCP-MCNC: 3.8 G/DL (ref 3.5–5.2)
ALP SERPL-CCNC: 140 U/L (ref 55–135)
ALT SERPL W/O P-5'-P-CCNC: 14 U/L (ref 10–44)
ANION GAP SERPL CALC-SCNC: 12 MMOL/L (ref 8–16)
AST SERPL-CCNC: 23 U/L (ref 10–40)
BASOPHILS # BLD AUTO: 0.07 K/UL (ref 0–0.2)
BASOPHILS NFR BLD: 1 % (ref 0–1.9)
BILIRUB SERPL-MCNC: 0.2 MG/DL (ref 0.1–1)
BUN SERPL-MCNC: 16 MG/DL (ref 8–23)
CALCIUM SERPL-MCNC: 9.4 MG/DL (ref 8.7–10.5)
CHLORIDE SERPL-SCNC: 104 MMOL/L (ref 95–110)
CO2 SERPL-SCNC: 21 MMOL/L (ref 23–29)
CREAT SERPL-MCNC: 1.2 MG/DL (ref 0.5–1.4)
DIFFERENTIAL METHOD: ABNORMAL
EOSINOPHIL # BLD AUTO: 0.2 K/UL (ref 0–0.5)
EOSINOPHIL NFR BLD: 3.2 % (ref 0–8)
ERYTHROCYTE [DISTWIDTH] IN BLOOD BY AUTOMATED COUNT: 15.6 % (ref 11.5–14.5)
EST. GFR  (AFRICAN AMERICAN): 54.8 ML/MIN/1.73 M^2
EST. GFR  (NON AFRICAN AMERICAN): 47.5 ML/MIN/1.73 M^2
GLUCOSE SERPL-MCNC: 160 MG/DL (ref 70–110)
HCT VFR BLD AUTO: 39.1 % (ref 37–48.5)
HGB BLD-MCNC: 12.5 G/DL (ref 12–16)
IMM GRANULOCYTES # BLD AUTO: 0.03 K/UL (ref 0–0.04)
IMM GRANULOCYTES NFR BLD AUTO: 0.4 % (ref 0–0.5)
LYMPHOCYTES # BLD AUTO: 1.7 K/UL (ref 1–4.8)
LYMPHOCYTES NFR BLD: 23.1 % (ref 18–48)
MCH RBC QN AUTO: 28.3 PG (ref 27–31)
MCHC RBC AUTO-ENTMCNC: 32 G/DL (ref 32–36)
MCV RBC AUTO: 89 FL (ref 82–98)
MONOCYTES # BLD AUTO: 0.6 K/UL (ref 0.3–1)
MONOCYTES NFR BLD: 8.8 % (ref 4–15)
NEUTROPHILS # BLD AUTO: 4.6 K/UL (ref 1.8–7.7)
NEUTROPHILS NFR BLD: 63.5 % (ref 38–73)
NRBC BLD-RTO: 0 /100 WBC
PLATELET # BLD AUTO: 247 K/UL (ref 150–350)
PMV BLD AUTO: 10.2 FL (ref 9.2–12.9)
POTASSIUM SERPL-SCNC: 4 MMOL/L (ref 3.5–5.1)
PROT SERPL-MCNC: 7.5 G/DL (ref 6–8.4)
RBC # BLD AUTO: 4.42 M/UL (ref 4–5.4)
SODIUM SERPL-SCNC: 137 MMOL/L (ref 136–145)
WBC # BLD AUTO: 7.27 K/UL (ref 3.9–12.7)

## 2020-02-03 PROCEDURE — 99213 OFFICE O/P EST LOW 20 MIN: CPT | Mod: S$PBB,,, | Performed by: INTERNAL MEDICINE

## 2020-02-03 PROCEDURE — 80053 COMPREHEN METABOLIC PANEL: CPT

## 2020-02-03 PROCEDURE — 99999 PR PBB SHADOW E&M-EST. PATIENT-LVL V: ICD-10-PCS | Mod: PBBFAC,,, | Performed by: INTERNAL MEDICINE

## 2020-02-03 PROCEDURE — 85025 COMPLETE CBC W/AUTO DIFF WBC: CPT

## 2020-02-03 PROCEDURE — 99215 OFFICE O/P EST HI 40 MIN: CPT | Mod: PBBFAC,PN | Performed by: INTERNAL MEDICINE

## 2020-02-03 PROCEDURE — 36415 COLL VENOUS BLD VENIPUNCTURE: CPT

## 2020-02-03 PROCEDURE — 99999 PR PBB SHADOW E&M-EST. PATIENT-LVL V: CPT | Mod: PBBFAC,,, | Performed by: INTERNAL MEDICINE

## 2020-02-03 PROCEDURE — 99213 PR OFFICE/OUTPT VISIT, EST, LEVL III, 20-29 MIN: ICD-10-PCS | Mod: S$PBB,,, | Performed by: INTERNAL MEDICINE

## 2020-02-03 NOTE — PATIENT INSTRUCTIONS
She complains of abdominal pain.  Ultrasound and labs will be obtained.  She has been followed by the nephrologist and states her kidney function is stable..  She is on the diabetic diet.  She is having upper abdominal pain with nausea and vomiting. She will be scheduled for upper endoscopy and dilatation of her gastrojejunostomy.  She follows up in the Pain Clinic and continues her current medications vitamins and minerals.

## 2020-02-03 NOTE — TELEPHONE ENCOUNTER
----- Message from Ayala Porter sent at 2/3/2020 10:32 AM CST -----  Contact: patient  Type:  Same Day Appointment Request    Caller is requesting a same day appointment.  Caller declined first available appointment listed below.      Name of Caller:  Patient  When is the first available appointment?  2/12  Symptoms:  No appetite/Vomiting  Best Call Back Number:    Additional Information: Please advise-thank you

## 2020-02-04 ENCOUNTER — HOSPITAL ENCOUNTER (OUTPATIENT)
Dept: RADIOLOGY | Facility: HOSPITAL | Age: 65
Discharge: HOME | End: 2020-02-04
Attending: INTERNAL MEDICINE
Payer: MEDICARE

## 2020-02-04 ENCOUNTER — TELEPHONE (OUTPATIENT)
Dept: GASTROENTEROLOGY | Facility: CLINIC | Age: 65
End: 2020-02-04

## 2020-02-04 DIAGNOSIS — R10.9 ABDOMINAL PAIN, UNSPECIFIED ABDOMINAL LOCATION: ICD-10-CM

## 2020-02-04 PROCEDURE — 76700 US ABDOMEN COMPLETE: ICD-10-PCS | Mod: 26,,, | Performed by: RADIOLOGY

## 2020-02-04 PROCEDURE — 76700 US EXAM ABDOM COMPLETE: CPT | Mod: TC,PN

## 2020-02-04 PROCEDURE — 76700 US EXAM ABDOM COMPLETE: CPT | Mod: 26,,, | Performed by: RADIOLOGY

## 2020-02-04 RX ORDER — PROMETHAZINE HYDROCHLORIDE 50 MG/1
50 TABLET ORAL EVERY 6 HOURS PRN
Qty: 100 TABLET | Refills: 1 | Status: SHIPPED | OUTPATIENT
Start: 2020-02-04 | End: 2020-03-11

## 2020-02-04 NOTE — TELEPHONE ENCOUNTER
----- Message from Kareem Lopez MD sent at 2/4/2020  7:23 AM CST -----  Tell her the hemoglobin is now normal. The glucose was 1 60.  Alkaline phosphatases almost normal.

## 2020-02-04 NOTE — TELEPHONE ENCOUNTER
----- Message from Kareem Lopez MD sent at 2/4/2020 11:49 AM CST -----  Tell her the ultrasound is normal except  it show she has had a cholecystectomy.

## 2020-02-05 NOTE — H&P (VIEW-ONLY)
Subjective:       Patient ID: Tessie De La Rosa is a 65 y.o. female.    Chief Complaint: Nausea and Emesis    She has chronic abdominal pain and history of chronic pancreatitis.  She has a family history of chronic pancreatitis.  She has had a gastrojejunostomy because of duodenal obstruction from the pancreatitis.  The anastomosis has been dilated in the past.  She complains of pain with abdominal distension and nausea without vomiting.  She denies fever chills. The hematologist has been trying to regulate her iron deficiency anemia but she has not been able to obtain an IV access.  She denies fever chills jaundice hematemesis hematochezia or bleeding.  She is followed by the nephrologist.  She states her diabetes is well controlled.  [facility].  She takes her medications as prescribed.  She takes her vitamins.  Her weight remains stable.      Allergies:  Review of patient's allergies indicates:   Allergen Reactions    Morphine      Muscle spasms, ABD pain    Quinine      Causes thrombocytopenia    Alprazolam     Diazepam     Duloxetine hcl     Pregabalin        Medications:    Current Outpatient Medications:     baclofen (LIORESAL) 10 MG tablet, , Disp: , Rfl: 0    cephALEXin (KEFLEX) 250 MG capsule, , Disp: , Rfl: 11    cholecalciferol, vitamin D3, 125 mcg (5,000 unit) Tab, , Disp: , Rfl:     cyanocobalamin 1,000 mcg/mL injection, INJECT 1 ML EVERY MONTH BY INTRAMUSCULAR ROUTE AS DIRECTED, Disp: 1 mL, Rfl: 6    dexlansoprazole (DEXILANT) 60 mg capsule, Take 1 capsule (60 mg total) by mouth once daily., Disp: 30 capsule, Rfl: 11    diclofenac sodium (VOLTAREN) 1 % Gel, , Disp: , Rfl: 0    diltiaZEM (CARDIZEM CD) 120 MG Cp24, , Disp: , Rfl: 4    ergocalciferol (ERGOCALCIFEROL) 50,000 unit Cap, , Disp: , Rfl: 2    escitalopram oxalate (LEXAPRO) 10 MG tablet, TAKE ONE TABLET BY MOUTH EVERY DAY, Disp: 90 tablet, Rfl: 0    furosemide (LASIX) 20 MG tablet, , Disp: , Rfl: 1     hydrALAZINE (APRESOLINE) 25 MG tablet, , Disp: , Rfl: 5    linaCLOtide (LINZESS) 145 mcg Cap capsule, Take 1 capsule (145 mcg total) by mouth once daily., Disp: 30 capsule, Rfl: 2    metFORMIN (GLUCOPHAGE) 500 MG tablet, TAKE ONE TABLET BY MOUTH TWICE DAILY, Disp: 180 tablet, Rfl: 1    metoprolol succinate (TOPROL-XL) 50 MG 24 hr tablet, , Disp: , Rfl: 1    oxyCODONE (ROXICODONE) 10 mg Tab immediate release tablet, , Disp: , Rfl:     PREMARIN 0.625 mg tablet, , Disp: , Rfl: 1    promethazine (PHENERGAN) 50 MG tablet, Take 1 tablet (50 mg total) by mouth every 6 (six) hours as needed., Disp: 100 tablet, Rfl: 1    QUEtiapine (SEROQUEL) 300 MG Tab, , Disp: , Rfl: 11    tiZANidine (ZANAFLEX) 4 MG tablet, , Disp: , Rfl:     triamcinolone acetonide 0.1% (KENALOG) 0.1 % cream, , Disp: , Rfl: 2    VENTOLIN HFA 90 mcg/actuation inhaler, , Disp: , Rfl: 1    XARELTO 20 mg Tab, , Disp: , Rfl: 1    Past Medical History:   Diagnosis Date    Acute pancreatitis     Anemia     Chronic constipation     Diverticulitis     Diverticulosis     GERD (gastroesophageal reflux disease)     Irritable bowel syndrome        Past Surgical History:   Procedure Laterality Date    CHOLECYSTECTOMY      COLONOSCOPY      ESOPHAGEAL DILATION N/A 5/21/2019    Procedure: DILATION, ESOPHAGUS;  Surgeon: Kareem Lopez MD;  Location: [facility];  Service: Endoscopy;  Laterality: N/A;    ESOPHAGOGASTRODUODENOSCOPY N/A 5/21/2019    Procedure: EGD (ESOPHAGOGASTRODUODENOSCOPY);  Surgeon: Kareem Lopez MD;  Location: [facility];  Service: Endoscopy;  Laterality: N/A;    UPPER GASTROINTESTINAL ENDOSCOPY           Review of Systems   Constitutional: Negative for appetite change, fever and unexpected weight change.   HENT: Negative for trouble swallowing.         No jaundice.   Respiratory: Negative for cough, shortness of breath and wheezing.         She denies tobacco usage.  She denies chronic chronic sputum dyspnea on exertion although she  is not as physically active.  She denies aspiration hemoptysis.  She does not use.   Cardiovascular: Negative for chest pain.        She denies exertional chest pain or rhythm disturbance.   Gastrointestinal: Positive for abdominal distention, abdominal pain, constipation and nausea. Negative for anal bleeding, blood in stool and diarrhea.        Has a history of diverticulosis and hemorrhoids.  She has occasional constipation.  She occasionally strains at stool.  She generally tries to stay on her bowel program to avoid constipation.  She has noted some abdominal distension recently.  She has had frequent nausea but denies vomiting. She is followed in the Pain Clinic for the abdominal pain secondary to to the chronic pancreatitis.   Endocrine:        She states her diabetes is well controlled.  She is followed by the nephrologist.     Musculoskeletal: Positive for arthralgias. Negative for back pain and neck pain.   Skin: Negative for pallor and rash.   Neurological: Negative for dizziness, seizures, syncope, speech difficulty, weakness and numbness.   Hematological: Negative for adenopathy.   Psychiatric/Behavioral: Negative for confusion.       Objective:      Physical Exam   Constitutional: She is oriented to person, place, and time. She appears well-nourished.   Well-nourished well-hydrated nonicteric white female.  She is oriented x3. She can she can present her history without difficulty. She can answer questions appropriately.   HENT:   Head: Normocephalic.   Eyes: Pupils are equal, round, and reactive to light. EOM are normal.   Neck: Normal range of motion. Neck supple. No tracheal deviation present. No thyromegaly present.   Cardiovascular: Normal rate, regular rhythm and normal heart sounds.   Pulmonary/Chest: Effort normal and breath sounds normal.   Abdominal: Soft. Bowel sounds are normal. She exhibits no distension and no mass. There is tenderness. There is no rebound and no guarding. No hernia.   Is  soft mildly obese with surgical scars.  There is tenderness in the epigastrium.  Organomegaly masses not detected.  Bowel sounds are normal.   Musculoskeletal: Normal range of motion.   She can ambulate normally.  She can go from the sitting to the standing position difficulty.   Lymphadenopathy:     She has no cervical adenopathy.   Neurological: She is alert and oriented to person, place, and time. No cranial nerve deficit.   Skin: Skin is warm and dry.   Psychiatric: She has a normal mood and affect. Her behavior is normal.   Vitals reviewed.        Plan:       Abdominal pain, unspecified abdominal location  -     CBC auto differential; Future; Expected date: 02/03/2020  -     Comprehensive metabolic panel; Future; Expected date: 02/03/2020  -     US Abdomen Complete; Future; Expected date: 02/03/2020    Nausea  -     promethazine (PHENERGAN) 50 MG tablet; Take 1 tablet (50 mg total) by mouth every 6 (six) hours as needed.  Dispense: 100 tablet; Refill: 1    Iron deficiency anemia, unspecified iron deficiency anemia type    Hiatal hernia    Gastroesophageal reflux disease, esophagitis presence not specified    Idiopathic chronic pancreatitis    Diverticulosis of large intestine without hemorrhage     She will be scheduled for upper endoscopy and dilatation of the anastomosis.  She continues her reflux regimen current medications vitamins and minerals.  She follows up with the nephrologist and also in the Pain Clinic.  She continues her ADA diet.  She continues her bowel program to avoid constipation.

## 2020-02-05 NOTE — PROGRESS NOTES
Subjective:       Patient ID: Tessie De La Rosa is a 65 y.o. female.    Chief Complaint: Nausea and Emesis    She has chronic abdominal pain and history of chronic pancreatitis.  She has a family history of chronic pancreatitis.  She has had a gastrojejunostomy because of duodenal obstruction from the pancreatitis.  The anastomosis has been dilated in the past.  She complains of pain with abdominal distension and nausea without vomiting.  She denies fever chills. The hematologist has been trying to regulate her iron deficiency anemia but she has not been able to obtain an IV access.  She denies fever chills jaundice hematemesis hematochezia or bleeding.  She is followed by the nephrologist.  She states her diabetes is well controlled.  She [facility].  She takes her medications as prescribed.  She takes her vitamins.  Her weight remains stable.      Allergies:  Review of patient's allergies indicates:   Allergen Reactions    Morphine      Muscle spasms, ABD pain    Quinine      Causes thrombocytopenia    Alprazolam     Diazepam     Duloxetine hcl     Pregabalin        Medications:    Current Outpatient Medications:     baclofen (LIORESAL) 10 MG tablet, , Disp: , Rfl: 0    cephALEXin (KEFLEX) 250 MG capsule, , Disp: , Rfl: 11    cholecalciferol, vitamin D3, 125 mcg (5,000 unit) Tab, , Disp: , Rfl:     cyanocobalamin 1,000 mcg/mL injection, INJECT 1 ML EVERY MONTH BY INTRAMUSCULAR ROUTE AS DIRECTED, Disp: 1 mL, Rfl: 6    dexlansoprazole (DEXILANT) 60 mg capsule, Take 1 capsule (60 mg total) by mouth once daily., Disp: 30 capsule, Rfl: 11    diclofenac sodium (VOLTAREN) 1 % Gel, , Disp: , Rfl: 0    diltiaZEM (CARDIZEM CD) 120 MG Cp24, , Disp: , Rfl: 4    ergocalciferol (ERGOCALCIFEROL) 50,000 unit Cap, , Disp: , Rfl: 2    escitalopram oxalate (LEXAPRO) 10 MG tablet, TAKE ONE TABLET BY MOUTH EVERY DAY, Disp: 90 tablet, Rfl: 0    furosemide (LASIX) 20 MG tablet, , Disp: , Rfl: 1     hydrALAZINE (APRESOLINE) 25 MG tablet, , Disp: , Rfl: 5    linaCLOtide (LINZESS) 145 mcg Cap capsule, Take 1 capsule (145 mcg total) by mouth once daily., Disp: 30 capsule, Rfl: 2    metFORMIN (GLUCOPHAGE) 500 MG tablet, TAKE ONE TABLET BY MOUTH TWICE DAILY, Disp: 180 tablet, Rfl: 1    metoprolol succinate (TOPROL-XL) 50 MG 24 hr tablet, , Disp: , Rfl: 1    oxyCODONE (ROXICODONE) 10 mg Tab immediate release tablet, , Disp: , Rfl:     PREMARIN 0.625 mg tablet, , Disp: , Rfl: 1    promethazine (PHENERGAN) 50 MG tablet, Take 1 tablet (50 mg total) by mouth every 6 (six) hours as needed., Disp: 100 tablet, Rfl: 1    QUEtiapine (SEROQUEL) 300 MG Tab, , Disp: , Rfl: 11    tiZANidine (ZANAFLEX) 4 MG tablet, , Disp: , Rfl:     triamcinolone acetonide 0.1% (KENALOG) 0.1 % cream, , Disp: , Rfl: 2    VENTOLIN HFA 90 mcg/actuation inhaler, , Disp: , Rfl: 1    XARELTO 20 mg Tab, , Disp: , Rfl: 1    Past Medical History:   Diagnosis Date    Acute pancreatitis     Anemia     Chronic constipation     Diverticulitis     Diverticulosis     GERD (gastroesophageal reflux disease)     Irritable bowel syndrome        Past Surgical History:   Procedure Laterality Date    CHOLECYSTECTOMY      COLONOSCOPY      ESOPHAGEAL DILATION N/A 5/21/2019    Procedure: DILATION, ESOPHAGUS;  Surgeon: Kareem Lopez MD;  Location: [facility];  Service: Endoscopy;  Laterality: N/A;    ESOPHAGOGASTRODUODENOSCOPY N/A 5/21/2019    Procedure: EGD (ESOPHAGOGASTRODUODENOSCOPY);  Surgeon: Kareem Lopez MD;  Location: [facility];  Service: Endoscopy;  Laterality: N/A;    UPPER GASTROINTESTINAL ENDOSCOPY           Review of Systems   Constitutional: Negative for appetite change, fever and unexpected weight change.   HENT: Negative for trouble swallowing.         No jaundice.   Respiratory: Negative for cough, shortness of breath and wheezing.         She denies tobacco usage.  She denies chronic chronic sputum dyspnea on exertion although she  is not as physically active.  She denies aspiration hemoptysis.  She does not use.   Cardiovascular: Negative for chest pain.        She denies exertional chest pain or rhythm disturbance.   Gastrointestinal: Positive for abdominal distention, abdominal pain, constipation and nausea. Negative for anal bleeding, blood in stool and diarrhea.        Has a history of diverticulosis and hemorrhoids.  She has occasional constipation.  She occasionally strains at stool.  She generally tries to stay on her bowel program to avoid constipation.  She has noted some abdominal distension recently.  She has had frequent nausea but denies vomiting. She is followed in the Pain Clinic for the abdominal pain secondary to to the chronic pancreatitis.   Endocrine:        She states her diabetes is well controlled.  She is followed by the nephrologist.     Musculoskeletal: Positive for arthralgias. Negative for back pain and neck pain.   Skin: Negative for pallor and rash.   Neurological: Negative for dizziness, seizures, syncope, speech difficulty, weakness and numbness.   Hematological: Negative for adenopathy.   Psychiatric/Behavioral: Negative for confusion.       Objective:      Physical Exam   Constitutional: She is oriented to person, place, and time. She appears well-nourished.   Well-nourished well-hydrated nonicteric white female.  She is oriented x3. She can she can present her history without difficulty. She can answer questions appropriately.   HENT:   Head: Normocephalic.   Eyes: Pupils are equal, round, and reactive to light. EOM are normal.   Neck: Normal range of motion. Neck supple. No tracheal deviation present. No thyromegaly present.   Cardiovascular: Normal rate, regular rhythm and normal heart sounds.   Pulmonary/Chest: Effort normal and breath sounds normal.   Abdominal: Soft. Bowel sounds are normal. She exhibits no distension and no mass. There is tenderness. There is no rebound and no guarding. No hernia.   Is  soft mildly obese with surgical scars.  There is tenderness in the epigastrium.  Organomegaly masses not detected.  Bowel sounds are normal.   Musculoskeletal: Normal range of motion.   She can ambulate normally.  She can go from the sitting to the standing position difficulty.   Lymphadenopathy:     She has no cervical adenopathy.   Neurological: She is alert and oriented to person, place, and time. No cranial nerve deficit.   Skin: Skin is warm and dry.   Psychiatric: She has a normal mood and affect. Her behavior is normal.   Vitals reviewed.        Plan:       Abdominal pain, unspecified abdominal location  -     CBC auto differential; Future; Expected date: 02/03/2020  -     Comprehensive metabolic panel; Future; Expected date: 02/03/2020  -     US Abdomen Complete; Future; Expected date: 02/03/2020    Nausea  -     promethazine (PHENERGAN) 50 MG tablet; Take 1 tablet (50 mg total) by mouth every 6 (six) hours as needed.  Dispense: 100 tablet; Refill: 1    Iron deficiency anemia, unspecified iron deficiency anemia type    Hiatal hernia    Gastroesophageal reflux disease, esophagitis presence not specified    Idiopathic chronic pancreatitis    Diverticulosis of large intestine without hemorrhage     She will be scheduled for upper endoscopy and dilatation of the anastomosis.  She continues her reflux regimen current medications vitamins and minerals.  She follows up with the nephrologist and also in the Pain Clinic.  She continues her ADA diet.  She continues her bowel program to avoid constipation.

## 2020-02-05 NOTE — PROGRESS NOTES
Subjective:       Patient ID: Tessie De La Rosa is a 65 y.o. female.    Chief Complaint: Nausea and Emesis    HPI    Allergies:  Review of patient's allergies indicates:   Allergen Reactions    Morphine      Muscle spasms, ABD pain    Quinine      Causes thrombocytopenia    Alprazolam     Diazepam     Duloxetine hcl     Pregabalin        Medications:    Current Outpatient Medications:     baclofen (LIORESAL) 10 MG tablet, , Disp: , Rfl: 0    cephALEXin (KEFLEX) 250 MG capsule, , Disp: , Rfl: 11    cholecalciferol, vitamin D3, 125 mcg (5,000 unit) Tab, , Disp: , Rfl:     cyanocobalamin 1,000 mcg/mL injection, INJECT 1 ML EVERY MONTH BY INTRAMUSCULAR ROUTE AS DIRECTED, Disp: 1 mL, Rfl: 6    dexlansoprazole (DEXILANT) 60 mg capsule, Take 1 capsule (60 mg total) by mouth once daily., Disp: 30 capsule, Rfl: 11    diclofenac sodium (VOLTAREN) 1 % Gel, , Disp: , Rfl: 0    diltiaZEM (CARDIZEM CD) 120 MG Cp24, , Disp: , Rfl: 4    ergocalciferol (ERGOCALCIFEROL) 50,000 unit Cap, , Disp: , Rfl: 2    escitalopram oxalate (LEXAPRO) 10 MG tablet, TAKE ONE TABLET BY MOUTH EVERY DAY, Disp: 90 tablet, Rfl: 0    furosemide (LASIX) 20 MG tablet, , Disp: , Rfl: 1    hydrALAZINE (APRESOLINE) 25 MG tablet, , Disp: , Rfl: 5    linaCLOtide (LINZESS) 145 mcg Cap capsule, Take 1 capsule (145 mcg total) by mouth once daily., Disp: 30 capsule, Rfl: 2    metFORMIN (GLUCOPHAGE) 500 MG tablet, TAKE ONE TABLET BY MOUTH TWICE DAILY, Disp: 180 tablet, Rfl: 1    metoprolol succinate (TOPROL-XL) 50 MG 24 hr tablet, , Disp: , Rfl: 1    oxyCODONE (ROXICODONE) 10 mg Tab immediate release tablet, , Disp: , Rfl:     PREMARIN 0.625 mg tablet, , Disp: , Rfl: 1    promethazine (PHENERGAN) 50 MG tablet, Take 1 tablet (50 mg total) by mouth every 6 (six) hours as needed., Disp: 100 tablet, Rfl: 1    QUEtiapine (SEROQUEL) 300 MG Tab, , Disp: , Rfl: 11    tiZANidine (ZANAFLEX) 4 MG tablet, , Disp: , Rfl:     triamcinolone acetonide  0.1% (KENALOG) 0.1 % cream, , Disp: , Rfl: 2    VENTOLIN HFA 90 mcg/actuation inhaler, , Disp: , Rfl: 1    XARELTO 20 mg Tab, , Disp: , Rfl: 1    Past Medical History:   Diagnosis Date    Acute pancreatitis     Anemia     Chronic constipation     Diverticulitis     Diverticulosis     GERD (gastroesophageal reflux disease)     Irritable bowel syndrome        Past Surgical History:   Procedure Laterality Date    CHOLECYSTECTOMY      COLONOSCOPY      ESOPHAGEAL DILATION N/A 5/21/2019    Procedure: DILATION, ESOPHAGUS;  Surgeon: Kareem Lopez MD;  Location: [facility];  Service: Endoscopy;  Laterality: N/A;    ESOPHAGOGASTRODUODENOSCOPY N/A 5/21/2019    Procedure: EGD (ESOPHAGOGASTRODUODENOSCOPY);  Surgeon: Kareem Lopez MD;  Location: [facility];  Service: Endoscopy;  Laterality: N/A;    UPPER GASTROINTESTINAL ENDOSCOPY           Review of Systems    Objective:      Physical Exam      Plan:       Abdominal pain, unspecified abdominal location  -     CBC auto differential; Future; Expected date: 02/03/2020  -     Comprehensive metabolic panel; Future; Expected date: 02/03/2020  -     US Abdomen Complete; Future; Expected date: 02/03/2020    Nausea  -     promethazine (PHENERGAN) 50 MG tablet; Take 1 tablet (50 mg total) by mouth every 6 (six) hours as needed.  Dispense: 100 tablet; Refill: 1    Iron deficiency anemia, unspecified iron deficiency anemia type    Hiatal hernia    Gastroesophageal reflux disease, esophagitis presence not specified    Idiopathic chronic pancreatitis    Diverticulosis of large intestine without hemorrhage

## 2020-02-18 ENCOUNTER — ANESTHESIA (OUTPATIENT)
Dept: SURGERY | Facility: HOSPITAL | Age: 65
End: 2020-02-18
Payer: MEDICARE

## 2020-02-18 ENCOUNTER — ANESTHESIA EVENT (OUTPATIENT)
Dept: SURGERY | Facility: HOSPITAL | Age: 65
End: 2020-02-18
Payer: MEDICARE

## 2020-02-18 ENCOUNTER — HOSPITAL ENCOUNTER (OUTPATIENT)
Facility: HOSPITAL | Age: 65
Discharge: HOME | End: 2020-02-18
Attending: INTERNAL MEDICINE | Admitting: INTERNAL MEDICINE
Payer: MEDICARE

## 2020-02-18 VITALS
HEIGHT: 64 IN | HEART RATE: 69 BPM | TEMPERATURE: 98 F | OXYGEN SATURATION: 96 % | DIASTOLIC BLOOD PRESSURE: 71 MMHG | SYSTOLIC BLOOD PRESSURE: 106 MMHG | BODY MASS INDEX: 25.61 KG/M2 | WEIGHT: 150 LBS | RESPIRATION RATE: 15 BRPM

## 2020-02-18 DIAGNOSIS — K22.2 ESOPHAGEAL STRICTURE: ICD-10-CM

## 2020-02-18 LAB — POCT GLUCOSE: 110 MG/DL (ref 70–110)

## 2020-02-18 PROCEDURE — 37000008 HC ANESTHESIA 1ST 15 MINUTES: Performed by: INTERNAL MEDICINE

## 2020-02-18 PROCEDURE — C1726 CATH, BAL DIL, NON-VASCULAR: HCPCS | Performed by: INTERNAL MEDICINE

## 2020-02-18 PROCEDURE — 63600175 PHARM REV CODE 636 W HCPCS: Performed by: ANESTHESIOLOGY

## 2020-02-18 PROCEDURE — 25000003 PHARM REV CODE 250: Performed by: NURSE ANESTHETIST, CERTIFIED REGISTERED

## 2020-02-18 PROCEDURE — 43245 EGD DILATE STRICTURE: CPT | Mod: ,,, | Performed by: INTERNAL MEDICINE

## 2020-02-18 PROCEDURE — D9220A PRA ANESTHESIA: Mod: ANES,,, | Performed by: ANESTHESIOLOGY

## 2020-02-18 PROCEDURE — D9220A PRA ANESTHESIA: Mod: CRNA,,, | Performed by: NURSE ANESTHETIST, CERTIFIED REGISTERED

## 2020-02-18 PROCEDURE — 43249 ESOPH EGD DILATION <30 MM: CPT | Performed by: INTERNAL MEDICINE

## 2020-02-18 PROCEDURE — D9220A PRA ANESTHESIA: ICD-10-PCS | Mod: ANES,,, | Performed by: ANESTHESIOLOGY

## 2020-02-18 PROCEDURE — D9220A PRA ANESTHESIA: ICD-10-PCS | Mod: CRNA,,, | Performed by: NURSE ANESTHETIST, CERTIFIED REGISTERED

## 2020-02-18 PROCEDURE — 63600175 PHARM REV CODE 636 W HCPCS: Performed by: NURSE ANESTHETIST, CERTIFIED REGISTERED

## 2020-02-18 PROCEDURE — 43245 PR EGD, FLEX, W/DILATION, GASTR/DUOD STRICTURE: ICD-10-PCS | Mod: ,,, | Performed by: INTERNAL MEDICINE

## 2020-02-18 RX ORDER — SODIUM CHLORIDE, SODIUM LACTATE, POTASSIUM CHLORIDE, CALCIUM CHLORIDE 600; 310; 30; 20 MG/100ML; MG/100ML; MG/100ML; MG/100ML
125 INJECTION, SOLUTION INTRAVENOUS CONTINUOUS
Status: DISCONTINUED | OUTPATIENT
Start: 2020-02-18 | End: 2020-02-18 | Stop reason: HOSPADM

## 2020-02-18 RX ORDER — LIDOCAINE HYDROCHLORIDE 10 MG/ML
INJECTION, SOLUTION EPIDURAL; INFILTRATION; INTRACAUDAL; PERINEURAL
Status: DISCONTINUED
Start: 2020-02-18 | End: 2020-02-18 | Stop reason: HOSPADM

## 2020-02-18 RX ORDER — SODIUM CHLORIDE, SODIUM LACTATE, POTASSIUM CHLORIDE, CALCIUM CHLORIDE 600; 310; 30; 20 MG/100ML; MG/100ML; MG/100ML; MG/100ML
INJECTION, SOLUTION INTRAVENOUS CONTINUOUS
Status: DISCONTINUED | OUTPATIENT
Start: 2020-02-18 | End: 2020-02-18 | Stop reason: HOSPADM

## 2020-02-18 RX ORDER — PROPOFOL 10 MG/ML
VIAL (ML) INTRAVENOUS
Status: DISCONTINUED | OUTPATIENT
Start: 2020-02-18 | End: 2020-02-18

## 2020-02-18 RX ORDER — ONDANSETRON 2 MG/ML
4 INJECTION INTRAMUSCULAR; INTRAVENOUS DAILY PRN
Status: DISCONTINUED | OUTPATIENT
Start: 2020-02-18 | End: 2020-02-18 | Stop reason: HOSPADM

## 2020-02-18 RX ORDER — LIDOCAINE HYDROCHLORIDE 20 MG/ML
INJECTION, SOLUTION EPIDURAL; INFILTRATION; INTRACAUDAL; PERINEURAL
Status: DISCONTINUED | OUTPATIENT
Start: 2020-02-18 | End: 2020-02-18

## 2020-02-18 RX ORDER — LIDOCAINE HYDROCHLORIDE 10 MG/ML
1 INJECTION, SOLUTION EPIDURAL; INFILTRATION; INTRACAUDAL; PERINEURAL ONCE
Status: DISCONTINUED | OUTPATIENT
Start: 2020-02-18 | End: 2020-02-18 | Stop reason: HOSPADM

## 2020-02-18 RX ADMIN — PROPOFOL 50 MG: 10 INJECTION, EMULSION INTRAVENOUS at 10:02

## 2020-02-18 RX ADMIN — SODIUM CHLORIDE, SODIUM LACTATE, POTASSIUM CHLORIDE, AND CALCIUM CHLORIDE: .6; .31; .03; .02 INJECTION, SOLUTION INTRAVENOUS at 10:02

## 2020-02-18 RX ADMIN — PROPOFOL 100 MG: 10 INJECTION, EMULSION INTRAVENOUS at 10:02

## 2020-02-18 RX ADMIN — LIDOCAINE HYDROCHLORIDE 100 MG: 20 INJECTION, SOLUTION EPIDURAL; INFILTRATION; INTRACAUDAL; PERINEURAL at 10:02

## 2020-02-18 NOTE — ANESTHESIA PREPROCEDURE EVALUATION
02/18/2020  Tessie De La Rosa is a 65 y.o., female.    Pre-op Assessment    I have reviewed the Patient Summary Reports.    I have reviewed the Nursing Notes.   I have reviewed the Medications.     Review of Systems  Anesthesia Hx:  No problems with previous Anesthesia  Neg history of prior surgery. Denies Family Hx of Anesthesia complications.   Denies Personal Hx of Anesthesia complications.   Social:  Non-Smoker    Hematology/Oncology:  Hematology Normal   Oncology Normal     EENT/Dental:EENT/Dental Normal   Cardiovascular:  Cardiovascular Normal     Pulmonary:  Pulmonary Normal    Renal/:   Chronic Renal Disease, ESRD    Hepatic/GI:   Hiatal Hernia, GERD, well controlled  Chronic pancreatitis    Musculoskeletal:  Musculoskeletal Normal    Neurological:  Neurology Normal    MS   Endocrine:  Endocrine Normal    Dermatological:  Skin Normal    Psych:   Psychiatric History depression          Physical Exam  General:  Well nourished    Airway/Jaw/Neck:  Airway Findings: Mouth Opening: Normal Tongue: Normal  General Airway Assessment: Adult  Mallampati: II  TM Distance: 4 - 6 cm        Eyes/Ears/Nose:  EYES/EARS/NOSE FINDINGS: Normal   Dental:  Dental Findings: Upper Dentures, Lower Dentures   Chest/Lungs:  Chest/Lungs Clear    Heart/Vascular:  Heart Findings: Normal Heart murmur: negative Vascular Findings: Normal    Abdomen:  Abdomen Findings: Normal    Musculoskeletal:  Musculoskeletal Findings: Normal   Skin:  Skin Findings: Normal    Mental Status:  Mental Status Findings: Normal        Anesthesia Plan  Type of Anesthesia, risks & benefits discussed:  Anesthesia Type:  general  Patient's Preference:   Intra-op Monitoring Plan: standard ASA monitors  Intra-op Monitoring Plan Comments:   Post Op Pain Control Plan:   Post Op Pain Control Plan Comments:   Induction:   IV  Beta Blocker:  Patient is not  currently on a Beta-Blocker (No further documentation required).       Informed Consent: Patient understands risks and agrees with Anesthesia plan.  Questions answered. Anesthesia consent signed with patient.  ASA Score: 3     Day of Surgery Review of History & Physical:    H&P update referred to the provider.

## 2020-02-18 NOTE — H&P
"Office Visit     2/3/2020  [facility]astro      Kareem Lopez MD   Gastroenterology   Abdominal pain, unspecified abdominal location +6 more   Dx   Nausea , Emesis ; Referred by Kareem Lopez MD   Reason for Visit    Additional Documentation     Vitals:    /107  (BP Location: Left arm, Patient Position: Sitting, BP Method: Medium (Automatic))    Pulse 115     Resp 18    Ht 5' 4" (1.626 m)    Wt 68 kg (150 lb)    SpO2 97%    BMI 25.75 kg/m²    BSA 1.75 m²    Flowsheets:    Anthropometrics       SmartForms:     [facility] AMB - FALL RISK       Encounter Info:    Billing Info,    History,    Allergies,    Detailed Report       Instructions         Follow up in about 1 month (around 3/3/2020).   She complains of abdominal pain.  Ultrasound and labs will be obtained.  She has been followed by the nephrologist and states her kidney function is stable..  She is on the diabetic diet.  She is having upper abdominal pain with nausea and vomiting. She will be scheduled for upper endoscopy and dilatation of her gastrojejunostomy.  She follows up in the Pain Clinic and continues her current medications vitamins and minerals.          After Visit Summary (Printed 2/3/2020)   Progress Notes        Subjective:       Patient ID: Tessie De La Rosa is a 65 y.o. female.     Chief Complaint: Nausea and Emesis     HPI     Allergies:        Review of patient's allergies indicates:   Allergen Reactions    Morphine         Muscle spasms, ABD pain    Quinine         Causes thrombocytopenia    Alprazolam      Diazepam      Duloxetine hcl      Pregabalin           Medications:     Current Outpatient Medications:     baclofen (LIORESAL) 10 MG tablet, , Disp: , Rfl: 0    cephALEXin (KEFLEX) 250 MG capsule, , Disp: , Rfl: 11    cholecalciferol, vitamin D3, 125 mcg (5,000 unit) Tab, , Disp: , Rfl:     cyanocobalamin 1,000 mcg/mL injection, INJECT 1 ML EVERY MONTH BY INTRAMUSCULAR ROUTE AS DIRECTED, " Disp: 1 mL, Rfl: 6    dexlansoprazole (DEXILANT) 60 mg capsule, Take 1 capsule (60 mg total) by mouth once daily., Disp: 30 capsule, Rfl: 11    diclofenac sodium (VOLTAREN) 1 % Gel, , Disp: , Rfl: 0    diltiaZEM (CARDIZEM CD) 120 MG Cp24, , Disp: , Rfl: 4    ergocalciferol (ERGOCALCIFEROL) 50,000 unit Cap, , Disp: , Rfl: 2    escitalopram oxalate (LEXAPRO) 10 MG tablet, TAKE ONE TABLET BY MOUTH EVERY DAY, Disp: 90 tablet, Rfl: 0    furosemide (LASIX) 20 MG tablet, , Disp: , Rfl: 1    hydrALAZINE (APRESOLINE) 25 MG tablet, , Disp: , Rfl: 5    linaCLOtide (LINZESS) 145 mcg Cap capsule, Take 1 capsule (145 mcg total) by mouth once daily., Disp: 30 capsule, Rfl: 2    metFORMIN (GLUCOPHAGE) 500 MG tablet, TAKE ONE TABLET BY MOUTH TWICE DAILY, Disp: 180 tablet, Rfl: 1    metoprolol succinate (TOPROL-XL) 50 MG 24 hr tablet, , Disp: , Rfl: 1    oxyCODONE (ROXICODONE) 10 mg Tab immediate release tablet, , Disp: , Rfl:     PREMARIN 0.625 mg tablet, , Disp: , Rfl: 1    promethazine (PHENERGAN) 50 MG tablet, Take 1 tablet (50 mg total) by mouth every 6 (six) hours as needed., Disp: 100 tablet, Rfl: 1    QUEtiapine (SEROQUEL) 300 MG Tab, , Disp: , Rfl: 11    tiZANidine (ZANAFLEX) 4 MG tablet, , Disp: , Rfl:     triamcinolone acetonide 0.1% (KENALOG) 0.1 % cream, , Disp: , Rfl: 2    VENTOLIN HFA 90 mcg/actuation inhaler, , Disp: , Rfl: 1    XARELTO 20 mg Tab, , Disp: , Rfl: 1          Past Medical History:   Diagnosis Date    Acute pancreatitis      Anemia      Chronic constipation      Diverticulitis      Diverticulosis      GERD (gastroesophageal reflux disease)      Irritable bowel syndrome                 Past Surgical History:   Procedure Laterality Date    CHOLECYSTECTOMY        COLONOSCOPY        ESOPHAGEAL DILATION N/A 5/21/2019     Procedure: DILATION, ESOPHAGUS;  Surgeon: Kareem Lopez MD;  Location: [facility];  Service: Endoscopy;  Laterality: N/A;    ESOPHAGOGASTRODUODENOSCOPY N/A  5/21/2019     Procedure: EGD (ESOPHAGOGASTRODUODENOSCOPY);  Surgeon: Kareem Lopez MD;  Location: [facility] ENDO;  Service: Endoscopy;  Laterality: N/A;    UPPER GASTROINTESTINAL ENDOSCOPY                Review of Systems    Objective:   Physical Exam       Plan:       Abdominal pain, unspecified abdominal location  -     CBC auto differential; Future; Expected date: 02/03/2020  -     Comprehensive metabolic panel; Future; Expected date: 02/03/2020  -     US Abdomen Complete; Future; Expected date: 02/03/2020     Nausea  -     promethazine (PHENERGAN) 50 MG tablet; Take 1 tablet (50 mg total) by mouth every 6 (six) hours as needed.  Dispense: 100 tablet; Refill: 1     Iron deficiency anemia, unspecified iron deficiency anemia type     Hiatal hernia     Gastroesophageal reflux disease, esophagitis presence not specified     Idiopathic chronic pancreatitis     Diverticulosis of large intestine without hemorrhage                Progress Notes        Subjective:       Patient ID: Tessie De La Rosa is a 65 y.o. female.     Chief Complaint: Nausea and Emesis     She has chronic abdominal pain and history of chronic pancreatitis.  She has a family history of chronic pancreatitis.  She has had a gastrojejunostomy because of duodenal obstruction from the pancreatitis.  The anastomosis has been dilated in the past.  She complains of pain with abdominal distension and nausea without vomiting.  She denies fever chills. The hematologist has been trying to regulate her iron deficiency anemia but she has not been able to obtain an IV access.  She denies fever chills jaundice hematemesis hematochezia or bleeding.  She is followed by the nephrologist.  She states her diabetes is well controlled.  She goes to the Pain Clinic.  She takes her medications as prescribed.  She takes her vitamins.  Her weight remains stable.        Allergies:        Review of patient's allergies indicates:   Allergen Reactions    Morphine          Muscle spasms, ABD pain    Quinine         Causes thrombocytopenia    Alprazolam      Diazepam      Duloxetine hcl      Pregabalin           Medications:     Current Outpatient Medications:     baclofen (LIORESAL) 10 MG tablet, , Disp: , Rfl: 0    cephALEXin (KEFLEX) 250 MG capsule, , Disp: , Rfl: 11    cholecalciferol, vitamin D3, 125 mcg (5,000 unit) Tab, , Disp: , Rfl:     cyanocobalamin 1,000 mcg/mL injection, INJECT 1 ML EVERY MONTH BY INTRAMUSCULAR ROUTE AS DIRECTED, Disp: 1 mL, Rfl: 6    dexlansoprazole (DEXILANT) 60 mg capsule, Take 1 capsule (60 mg total) by mouth once daily., Disp: 30 capsule, Rfl: 11    diclofenac sodium (VOLTAREN) 1 % Gel, , Disp: , Rfl: 0    diltiaZEM (CARDIZEM CD) 120 MG Cp24, , Disp: , Rfl: 4    ergocalciferol (ERGOCALCIFEROL) 50,000 unit Cap, , Disp: , Rfl: 2    escitalopram oxalate (LEXAPRO) 10 MG tablet, TAKE ONE TABLET BY MOUTH EVERY DAY, Disp: 90 tablet, Rfl: 0    furosemide (LASIX) 20 MG tablet, , Disp: , Rfl: 1    hydrALAZINE (APRESOLINE) 25 MG tablet, , Disp: , Rfl: 5    linaCLOtide (LINZESS) 145 mcg Cap capsule, Take 1 capsule (145 mcg total) by mouth once daily., Disp: 30 capsule, Rfl: 2    metFORMIN (GLUCOPHAGE) 500 MG tablet, TAKE ONE TABLET BY MOUTH TWICE DAILY, Disp: 180 tablet, Rfl: 1    metoprolol succinate (TOPROL-XL) 50 MG 24 hr tablet, , Disp: , Rfl: 1    oxyCODONE (ROXICODONE) 10 mg Tab immediate release tablet, , Disp: , Rfl:     PREMARIN 0.625 mg tablet, , Disp: , Rfl: 1    promethazine (PHENERGAN) 50 MG tablet, Take 1 tablet (50 mg total) by mouth every 6 (six) hours as needed., Disp: 100 tablet, Rfl: 1    QUEtiapine (SEROQUEL) 300 MG Tab, , Disp: , Rfl: 11    tiZANidine (ZANAFLEX) 4 MG tablet, , Disp: , Rfl:     triamcinolone acetonide 0.1% (KENALOG) 0.1 % cream, , Disp: , Rfl: 2    VENTOLIN HFA 90 mcg/actuation inhaler, , Disp: , Rfl: 1    XARELTO 20 mg Tab, , Disp: , Rfl: 1          Past Medical History:   Diagnosis Date    Acute  pancreatitis      Anemia      Chronic constipation      Diverticulitis      Diverticulosis      GERD (gastroesophageal reflux disease)      Irritable bowel syndrome                 Past Surgical History:   Procedure Laterality Date    CHOLECYSTECTOMY        COLONOSCOPY        ESOPHAGEAL DILATION N/A 5/21/2019     Procedure: DILATION, ESOPHAGUS;  Surgeon: Kareem Lopez MD;  Location: [facility] ENDO;  Service: Endoscopy;  Laterality: N/A;    ESOPHAGOGASTRODUODENOSCOPY N/A 5/21/2019     Procedure: EGD (ESOPHAGOGASTRODUODENOSCOPY);  Surgeon: Kareem Lopez MD;  Location: [facility] ENDO;  Service: Endoscopy;  Laterality: N/A;    UPPER GASTROINTESTINAL ENDOSCOPY                Review of Systems   Constitutional: Negative for appetite change, fever and unexpected weight change.   HENT: Negative for trouble swallowing.         No jaundice.   Respiratory: Negative for cough, shortness of breath and wheezing.         She denies tobacco usage.  She denies chronic chronic sputum dyspnea on exertion although she is not as physically active.  She denies aspiration hemoptysis.  She does not use.   Cardiovascular: Negative for chest pain.        She denies exertional chest pain or rhythm disturbance.   Gastrointestinal: Positive for abdominal distention, abdominal pain, constipation and nausea. Negative for anal bleeding, blood in stool and diarrhea.        Has a history of diverticulosis and hemorrhoids.  She has occasional constipation.  She occasionally strains at stool.  She generally tries to stay on her bowel program to avoid constipation.  She has noted some abdominal distension recently.  She has had frequent nausea but denies vomiting. She is followed in the Pain Clinic for the abdominal pain secondary to to the chronic pancreatitis.   Endocrine:        She states her diabetes is well controlled.  She is followed by the nephrologist.     Musculoskeletal: Positive for arthralgias. Negative for back pain and neck pain.    Skin: Negative for pallor and rash.   Neurological: Negative for dizziness, seizures, syncope, speech difficulty, weakness and numbness.   Hematological: Negative for adenopathy.   Psychiatric/Behavioral: Negative for confusion.       Objective:   Physical Exam   Constitutional: She is oriented to person, place, and time. She appears well-nourished.   Well-nourished well-hydrated nonicteric white female.  She is oriented x3. She can she can present her history without difficulty. She can answer questions appropriately.   HENT:   Head: Normocephalic.   Eyes: Pupils are equal, round, and reactive to light. EOM are normal.   Neck: Normal range of motion. Neck supple. No tracheal deviation present. No thyromegaly present.   Cardiovascular: Normal rate, regular rhythm and normal heart sounds.   Pulmonary/Chest: Effort normal and breath sounds normal.   Abdominal: Soft. Bowel sounds are normal. She exhibits no distension and no mass. There is tenderness. There is no rebound and no guarding. No hernia.   Is soft mildly obese with surgical scars.  There is tenderness in the epigastrium.  Organomegaly masses not detected.  Bowel sounds are normal.   Musculoskeletal: Normal range of motion.   She can ambulate normally.  She can go from the sitting to the standing position difficulty.   Lymphadenopathy:     She has no cervical adenopathy.   Neurological: She is alert and oriented to person, place, and time. No cranial nerve deficit.   Skin: Skin is warm and dry.   Psychiatric: She has a normal mood and affect. Her behavior is normal.   Vitals reviewed.         Plan:       Abdominal pain, unspecified abdominal location  -     CBC auto differential; Future; Expected date: 02/03/2020  -     Comprehensive metabolic panel; Future; Expected date: 02/03/2020  -     US Abdomen Complete; Future; Expected date: 02/03/2020     Nausea  -     promethazine (PHENERGAN) 50 MG tablet; Take 1 tablet (50 mg total) by mouth every 6 (six) hours  as needed.  Dispense: 100 tablet; Refill: 1     Iron deficiency anemia, unspecified iron deficiency anemia type     Hiatal hernia     Gastroesophageal reflux disease, esophagitis presence not specified     Idiopathic chronic pancreatitis     Diverticulosis of large intestine without hemorrhage      She will be scheduled for upper endoscopy and dilatation of the anastomosis.  She continues her reflux regimen current medications vitamins and minerals.  She follows up with the nephrologist and also in the Pain Clinic.  She continues her ADA diet.  She continues her bowel program to avoid constipation.          Other Notes      All notes   Not recorded   All Charges for This Encounter     Code Description Service Date Service Provider Modifiers Qty   08887 NM OFFICE/OUTPT VISIT,EST,LEVL III 2/3/2020 Kareem Lopez MD S$PBB 1   43075769 HC E&M-EST. PATIENT-LVL V 2/3/2020 Kareem Lopez MD PBBFAC, PN 1   [id number] NM PBB SHADOW E&M-EST. PATIENT-LVL V 2/3/2020 Kareem Lopez MD PBBFAC 1   Level of Service     Level of Service   NM OFFICE/OUTPT VISIT,EST,LEVL III [59278]   BestPractice Advisories     Click to view BestPractice Advisory history   AVS Reports     Date/Time Report Action User   2/3/2020  1:28 PM After Visit Summary Printed Amanda Leija LPN   Encounter-Level Documents - 02/03/2020:     After Visit Summary - Document on 2/3/2020 1:28 PM by Amanda Leija LPN: After Visit Summary   Orders Placed         CBC auto differential       Comprehensive metabolic panel       US Abdomen Complete      All Encounter Results    Medication Changes        None      Medication List    Fall Risk     Patient Mobility Status: Ambulatory   Number of falls in the past 12 months?: 0   Fall Risk?: No   Visit Diagnoses         Abdominal pain, unspecified abdominal location      Nausea      Iron deficiency anemia, unspecified iron deficiency anemia type      Hiatal hernia      Gastroesophageal reflux disease, esophagitis presence not  specified      Idiopathic chronic pancreatitis      Diverticulosis of large intestine without hemorrhage      Problem List    She is here for upper endoscopy and dilatation of the narrowed gastrojejunostomy.  She has good health knowledge.  She understands the procedure. Specifically she realizes there is an extremely small incidence of bleeding perforation or aspiration.  History and physical are unchanged.

## 2020-02-18 NOTE — DISCHARGE SUMMARY
Upper endoscopy revealed she had retained liquid in the stomach.  The anastomosis was dilated.  She was found to have esophagitis.  She will continue current medication pre me the stomach and she will need to have a stool for she she has been on loss to PPI therapy.  She will continue her reflux regimen current medications vitamins and minerals.  She recently had an ultrasound and I have requested a copy.  She has a followup upon the office.

## 2020-02-18 NOTE — ANESTHESIA POSTPROCEDURE EVALUATION
Anesthesia Post Evaluation    Patient: Tessie De La Rosa    Procedure(s) Performed: Procedure(s) (LRB):  EGD W/ DILAT (ESOPHAGOGASTRODUODENOSCOPY WITH DILATION) (N/A)    Final Anesthesia Type: general    Patient location during evaluation: PACU  Patient participation: Yes- Able to Participate  Level of consciousness: awake and awake and alert  Post-procedure vital signs: reviewed and stable  Pain management: adequate  Airway patency: patent    PONV status at discharge: No PONV  Anesthetic complications: no      Cardiovascular status: blood pressure returned to baseline  Respiratory status: unassisted and spontaneous ventilation  Hydration status: euvolemic  Follow-up not needed.          Vitals Value Taken Time   /67 2/18/2020 11:26 AM   Temp 36.7 °C (98.1 °F) 2/18/2020  9:35 AM   Pulse 69 2/18/2020 11:27 AM   Resp 9 2/18/2020 11:27 AM   SpO2 94 % 2/18/2020 11:25 AM   Vitals shown include unvalidated device data.      Event Time     Out of Recovery 11:20:00          Pain/Sravani Score: Sravani Score: 10 (2/18/2020 11:15 AM)  Modified Sravani Score: 19 (2/18/2020 11:20 AM)

## 2020-02-18 NOTE — OP NOTE
Procedure. Esophagogastroduodenoscopy with dilatation of the gastrojejunostomy anastomosis.  Indications.  She has a history of duodenal obstruction for chronic pancreatitis.  She has a gastrojejunostomy.  She has undergone dilatation at intervals.  She does not want further surgery.  With dilatation of the anastomosis she is able to tolerate her oral intake.  She generally picks and chooses .  She tries to eat the foods that she can tolerate.  She states her diabetes is well controlled.  She has good health knowledge.  She understands the procedure of upper endoscopy and dilatation.  Specifically she realizes there is an extremely small incidence of bleeding perforation or aspiration.  Anesthesia.  Monitored anesthesia coverage.  Procedure. With the patient in left lateral supine position in the procedure room.  The the Pentax and upper endoscope was passed without difficulty.  The the gastroesophageal junction was at 37-38 cm.  This Q junction was at 37-38 cm.  The scope was passed in the stomach there was a small hiatal hernia.  There was retained liquid in the stomach. She had documented preprocedure fasting.  There is hyperemia of the cardia body and antrum.  The pylorus was open.  The bulb and 2nd part appeared to be normal. The anastomosis.  Be narrowed.  The TTS balloon 15 was inflated.  Patient tolerated the procedure well.  Impression 1.  Esophagitis LA grade A 2.  Small hiatal hernia 3.  Narrowed gastrojejunostomy

## 2020-02-18 NOTE — [employer] PATIENT INSTRUCTIONS
Discharge Summary/Instructions after an Endoscopic Procedure  Patient Name: Tessie De La Rosa  Patient MRN: [id number]  Patient YOB: 1955 Tuesday, February 18, 2020  Kareem Lopez MD  RESTRICTIONS:  During your procedure today, you received medications for sedation.  These   medications may affect your judgment, balance and coordination.  Therefore,   for 24 hours, you have the following restrictions:   - DO NOT drive a car, operate machinery, make legal/financial decisions,   sign important papers or drink alcohol.    ACTIVITY:  Today: no heavy lifting, straining or running due to procedural   sedation/anesthesia.  The following day: return to full activity including work.  DIET:  Eat and drink normally unless instructed otherwise.     TREATMENT FOR COMMON SIDE EFFECTS:  - Mild abdominal pain, nausea, belching, bloating or excessive gas:  rest,   eat lightly and use a heating pad.  - Sore Throat: treat with throat lozenges and/or gargle with warm salt   water.  - Because air was used during the procedure, expelling large amounts of air   from your rectum or belching is normal.  - If a bowel prep was taken, you may not have a bowel movement for 1-3 days.    This is normal.  SYMPTOMS TO WATCH FOR AND REPORT TO YOUR PHYSICIAN:  1. Abdominal pain or bloating, other than gas cramps.  2. Chest pain.  3. Back pain.  4. Signs of infection such as: chills or fever occurring within 24 hours   after the procedure.  5. Rectal bleeding, which would show as bright red, maroon, or black stools.   (A tablespoon of blood from the rectum is not serious, especially if   hemorrhoids are present.)  6. Vomiting.  7. Weakness or dizziness.  GO DIRECTLY TO THE NEAREST EMERGENCY ROOM IF YOU HAVE ANY OF THE FOLLOWING:      Difficulty breathing              Chills and/or fever over 101 F   Persistent vomiting and/or vomiting blood   Severe abdominal pain   Severe chest pain   Black, tarry stools   Bleeding- more than one  tablespoon   Any other symptom or condition that you feel may need urgent attention  Your doctor recommends these additional instructions:  If any biopsies were taken, your doctors clinic will contact you in 1 to 2   weeks with any results.  - Discharge patient to home (ambulatory).   - Resume previous diet.  For questions, problems or results please call your physician - Kareem Lopez MD at Work:  [phone].  [facility] EMERGENCY ROOM PHONE NUMBER: [phone]  IF A COMPLICATION OR EMERGENCY SITUATION ARISES AND YOU ARE UNABLE TO REACH   YOUR PHYSICIAN - GO DIRECTLY TO THE EMERGENCY ROOM.  MD Kareem Triplett MD  2/18/2020 10:59:00 AM  This report has been verified and signed electronically.  [employer]

## 2020-02-18 NOTE — TRANSFER OF CARE
"Anesthesia Transfer of Care Note    Patient: Tessie De La Rosa    Procedure(s) Performed: Procedure(s) (LRB):  EGD W/ DILAT (ESOPHAGOGASTRODUODENOSCOPY WITH DILATION) (N/A)    Patient location: PACU    Anesthesia Type: general    Transport from OR: Transported from OR on room air with adequate spontaneous ventilation    Post pain: adequate analgesia    Post assessment: no apparent anesthetic complications and tolerated procedure well    Post vital signs: stable    Level of consciousness: awake, alert and oriented    Nausea/Vomiting: no nausea/vomiting    Complications: none    Transfer of care protocol was followed      Last vitals:   Visit Vitals  BP (!) 104/58   Pulse 66   Temp 36.7 °C (98.1 °F) (Oral)   Resp 16   Ht 5' 4" (1.626 m)   Wt 68 kg (150 lb)   SpO2 97%   BMI 25.75 kg/m²     "

## 2020-03-02 ENCOUNTER — LAB VISIT (OUTPATIENT)
Dept: LAB | Facility: HOSPITAL | Age: 65
End: 2020-03-02
Attending: INTERNAL MEDICINE
Payer: MEDICARE

## 2020-03-02 ENCOUNTER — TELEPHONE (OUTPATIENT)
Dept: GASTROENTEROLOGY | Facility: CLINIC | Age: 65
End: 2020-03-02

## 2020-03-02 ENCOUNTER — OFFICE VISIT (OUTPATIENT)
Dept: GASTROENTEROLOGY | Facility: CLINIC | Age: 65
End: 2020-03-02
Payer: MEDICARE

## 2020-03-02 VITALS
BODY MASS INDEX: 25.27 KG/M2 | HEIGHT: 64 IN | HEART RATE: 112 BPM | DIASTOLIC BLOOD PRESSURE: 93 MMHG | WEIGHT: 148 LBS | RESPIRATION RATE: 18 BRPM | SYSTOLIC BLOOD PRESSURE: 161 MMHG

## 2020-03-02 DIAGNOSIS — G89.4 CHRONIC PAIN SYNDROME: ICD-10-CM

## 2020-03-02 DIAGNOSIS — R11.0 NAUSEA: Primary | ICD-10-CM

## 2020-03-02 DIAGNOSIS — K56.699 ANASTOMOTIC STENOSIS OF GASTROJEJUNOSTOMY: ICD-10-CM

## 2020-03-02 DIAGNOSIS — K44.9 HIATAL HERNIA: ICD-10-CM

## 2020-03-02 DIAGNOSIS — K57.30 DIVERTICULOSIS OF LARGE INTESTINE WITHOUT HEMORRHAGE: ICD-10-CM

## 2020-03-02 DIAGNOSIS — K21.9 GASTROESOPHAGEAL REFLUX DISEASE WITHOUT ESOPHAGITIS: ICD-10-CM

## 2020-03-02 DIAGNOSIS — R11.2 NAUSEA AND VOMITING, INTRACTABILITY OF VOMITING NOT SPECIFIED, UNSPECIFIED VOMITING TYPE: ICD-10-CM

## 2020-03-02 DIAGNOSIS — K86.1 IDIOPATHIC CHRONIC PANCREATITIS: ICD-10-CM

## 2020-03-02 LAB
ALBUMIN SERPL BCP-MCNC: 3.7 G/DL (ref 3.5–5.2)
ALP SERPL-CCNC: 149 U/L (ref 55–135)
ALT SERPL W/O P-5'-P-CCNC: 10 U/L (ref 10–44)
ANION GAP SERPL CALC-SCNC: 14 MMOL/L (ref 8–16)
AST SERPL-CCNC: 17 U/L (ref 10–40)
BASOPHILS # BLD AUTO: 0.08 K/UL (ref 0–0.2)
BASOPHILS NFR BLD: 0.9 % (ref 0–1.9)
BILIRUB SERPL-MCNC: 0.2 MG/DL (ref 0.1–1)
BUN SERPL-MCNC: 16 MG/DL (ref 8–23)
CALCIUM SERPL-MCNC: 9.8 MG/DL (ref 8.7–10.5)
CHLORIDE SERPL-SCNC: 103 MMOL/L (ref 95–110)
CO2 SERPL-SCNC: 23 MMOL/L (ref 23–29)
CREAT SERPL-MCNC: 1.2 MG/DL (ref 0.5–1.4)
DIFFERENTIAL METHOD: ABNORMAL
EOSINOPHIL # BLD AUTO: 0.2 K/UL (ref 0–0.5)
EOSINOPHIL NFR BLD: 2.3 % (ref 0–8)
ERYTHROCYTE [DISTWIDTH] IN BLOOD BY AUTOMATED COUNT: 15.7 % (ref 11.5–14.5)
EST. GFR  (AFRICAN AMERICAN): 54.8 ML/MIN/1.73 M^2
EST. GFR  (NON AFRICAN AMERICAN): 47.5 ML/MIN/1.73 M^2
GLUCOSE SERPL-MCNC: 93 MG/DL (ref 70–110)
HCT VFR BLD AUTO: 40 % (ref 37–48.5)
HGB BLD-MCNC: 13 G/DL (ref 12–16)
IMM GRANULOCYTES # BLD AUTO: 0.03 K/UL (ref 0–0.04)
IMM GRANULOCYTES NFR BLD AUTO: 0.3 % (ref 0–0.5)
LYMPHOCYTES # BLD AUTO: 2.6 K/UL (ref 1–4.8)
LYMPHOCYTES NFR BLD: 28.3 % (ref 18–48)
MCH RBC QN AUTO: 28.4 PG (ref 27–31)
MCHC RBC AUTO-ENTMCNC: 32.5 G/DL (ref 32–36)
MCV RBC AUTO: 87 FL (ref 82–98)
MONOCYTES # BLD AUTO: 0.6 K/UL (ref 0.3–1)
MONOCYTES NFR BLD: 6.2 % (ref 4–15)
NEUTROPHILS # BLD AUTO: 5.6 K/UL (ref 1.8–7.7)
NEUTROPHILS NFR BLD: 62 % (ref 38–73)
NRBC BLD-RTO: 0 /100 WBC
PLATELET # BLD AUTO: 275 K/UL (ref 150–350)
PMV BLD AUTO: 10.6 FL (ref 9.2–12.9)
POTASSIUM SERPL-SCNC: 3.4 MMOL/L (ref 3.5–5.1)
PROT SERPL-MCNC: 7.4 G/DL (ref 6–8.4)
RBC # BLD AUTO: 4.58 M/UL (ref 4–5.4)
SODIUM SERPL-SCNC: 140 MMOL/L (ref 136–145)
WBC # BLD AUTO: 9.03 K/UL (ref 3.9–12.7)

## 2020-03-02 PROCEDURE — 99999 PR PBB SHADOW E&M-EST. PATIENT-LVL III: ICD-10-PCS | Mod: PBBFAC,,, | Performed by: INTERNAL MEDICINE

## 2020-03-02 PROCEDURE — 99999 PR PBB SHADOW E&M-EST. PATIENT-LVL III: CPT | Mod: PBBFAC,,, | Performed by: INTERNAL MEDICINE

## 2020-03-02 PROCEDURE — 85025 COMPLETE CBC W/AUTO DIFF WBC: CPT

## 2020-03-02 PROCEDURE — 99213 OFFICE O/P EST LOW 20 MIN: CPT | Mod: PBBFAC,PN | Performed by: INTERNAL MEDICINE

## 2020-03-02 PROCEDURE — 36415 COLL VENOUS BLD VENIPUNCTURE: CPT

## 2020-03-02 PROCEDURE — 99213 PR OFFICE/OUTPT VISIT, EST, LEVL III, 20-29 MIN: ICD-10-PCS | Mod: S$PBB,,, | Performed by: INTERNAL MEDICINE

## 2020-03-02 PROCEDURE — 99213 OFFICE O/P EST LOW 20 MIN: CPT | Mod: S$PBB,,, | Performed by: INTERNAL MEDICINE

## 2020-03-02 PROCEDURE — 80053 COMPREHEN METABOLIC PANEL: CPT

## 2020-03-02 NOTE — TELEPHONE ENCOUNTER
----- Message from Pau Bruno sent at 3/2/2020  9:32 AM CST -----  Type:  Sooner Apoointment Request    Caller is requesting a sooner appointment.  Caller declined first available appointment listed below.  Caller will not accept being placed on the waitlist and is requesting a message be sent to doctor.    Name of Caller:  Self   When is the first available appointment?  03/12/2020 Symptoms:  Vomiting, diarrhea, pt complain of pain  Best Call Back Number:  [phone]  Additional Information:

## 2020-03-02 NOTE — PATIENT INSTRUCTIONS
She has had upper abdominal pain with nausea and vomiting.  The recent ultrasound revealed she had a cholecystectomy.  Upper endoscopy revealed a narrowed gastrojejunostomy with retained food.  She was dilated.  Because of the upper GI symptoms initially a barium swallow and upper GI labs.

## 2020-03-02 NOTE — PROGRESS NOTES
"Subjective:       Patient ID: Tessie De La Rosa is a 65 y.o. female.    Chief Complaint: Nausea; Emesis; and Diarrhea    Recent upper endoscopy revealed that she had retained food.  The gastrojejunostomy was narrowed and was dilated.  She was found to have a small hiatal hernia with mild esophagitis.  She has a history of chronic pancreatitis and duodenal obstruction.  She generally can regulate her diabetic diet and avoid the foods that a "upset me and hard to digest ".  She has chronic nausea.  She has seen the nephrologist.  She states that her diabetes is well controlled.  She has a chronic pain syndrome.   Her mother and niece both had chronic pancreatitis.  She has never used tobacco alcohol.  For the last week she has had nausea and vomiting.  She has  chronic abdominal pain radiating through to the back.  She denies hematemesis hematochezia jaundice or bleeding.  She is going to the [facility] Clinic.  She cannot pinpoint a reason for the change in symptoms.      Allergies:  Review of patient's allergies indicates:   Allergen Reactions    Morphine      Muscle spasms, ABD pain    Quinine      Causes thrombocytopenia    Alprazolam     Diazepam     Duloxetine hcl     Pregabalin        Medications:    Current Outpatient Medications:     baclofen (LIORESAL) 10 MG tablet, , Disp: , Rfl: 0    cephALEXin (KEFLEX) 250 MG capsule, , Disp: , Rfl: 11    cholecalciferol, vitamin D3, 125 mcg (5,000 unit) Tab, , Disp: , Rfl:     cyanocobalamin 1,000 mcg/mL injection, INJECT 1 ML EVERY MONTH BY INTRAMUSCULAR ROUTE AS DIRECTED, Disp: 1 mL, Rfl: 6    dexlansoprazole (DEXILANT) 60 mg capsule, Take 1 capsule (60 mg total) by mouth once daily., Disp: 30 capsule, Rfl: 11    diclofenac sodium (VOLTAREN) 1 % Gel, , Disp: , Rfl: 0    diltiaZEM (CARDIZEM CD) 120 MG Cp24, , Disp: , Rfl: 4    ergocalciferol (ERGOCALCIFEROL) 50,000 unit Cap, , Disp: , Rfl: 2    escitalopram oxalate (LEXAPRO) 10 MG tablet, TAKE ONE TABLET " BY MOUTH EVERY DAY, Disp: 90 tablet, Rfl: 0    furosemide (LASIX) 20 MG tablet, , Disp: , Rfl: 1    hydrALAZINE (APRESOLINE) 25 MG tablet, , Disp: , Rfl: 5    linaCLOtide (LINZESS) 145 mcg Cap capsule, Take 1 capsule (145 mcg total) by mouth once daily., Disp: 30 capsule, Rfl: 2    metFORMIN (GLUCOPHAGE) 500 MG tablet, TAKE ONE TABLET BY MOUTH TWICE DAILY, Disp: 180 tablet, Rfl: 1    metoprolol succinate (TOPROL-XL) 50 MG 24 hr tablet, , Disp: , Rfl: 1    oxyCODONE (ROXICODONE) 10 mg Tab immediate release tablet, , Disp: , Rfl:     PREMARIN 0.625 mg tablet, , Disp: , Rfl: 1    promethazine (PHENERGAN) 50 MG tablet, Take 1 tablet (50 mg total) by mouth every 6 (six) hours as needed., Disp: 100 tablet, Rfl: 1    QUEtiapine (SEROQUEL) 300 MG Tab, , Disp: , Rfl: 11    tiZANidine (ZANAFLEX) 4 MG tablet, , Disp: , Rfl:     triamcinolone acetonide 0.1% (KENALOG) 0.1 % cream, , Disp: , Rfl: 2    VENTOLIN HFA 90 mcg/actuation inhaler, , Disp: , Rfl: 1    XARELTO 20 mg Tab, , Disp: , Rfl: 1    Past Medical History:   Diagnosis Date    Acute pancreatitis     Anemia     Chronic constipation     Diverticulitis     Diverticulosis     GERD (gastroesophageal reflux disease)     Irritable bowel syndrome     Kidney disease, chronic, stage III (GFR 30-59 ml/min)     Multiple sclerosis 1985    Neurogenic bladder     Pancreatitis, chronic        Past Surgical History:   Procedure Laterality Date    CHOLECYSTECTOMY      COLONOSCOPY      ESOPHAGEAL DILATION N/A 5/21/2019    Procedure: DILATION, ESOPHAGUS;  Surgeon: Kareem Lopez MD;  Location: [facility] END[facility];  Service: Endoscopy;  Laterality: N/A;    ESOPHAGOGASTRODUODENOSCOPY N/A 5/21/2019    Procedure: EGD (ESOPHAGOGASTRODUODENOSCOPY);  Surgeon: Kareem Lopez MD;  Location: [facility] ENDO;  Service: Endoscopy;  Laterality: N/A;    ESOPHAGOGASTRODUODENOSCOPY N/A 2/18/2020    Procedure: EGD W/ DILAT (ESOPHAGOGASTRODUODENOSCOPY WITH DILATION);  Surgeon: Kareem Lopez  MD;  Location: [facility];  Service: Endoscopy;  Laterality: N/A;    EXPLORATORY LAPAROTOMY      GASTRIC BYPASS      UPPER GASTROINTESTINAL ENDOSCOPY           Review of Systems   Constitutional: Negative for appetite change, fever and unexpected weight change.   HENT: Negative for trouble swallowing.         No jaundice.   Respiratory: Negative for cough, shortness of breath and wheezing.         She denies tobacco alcohol usage.  She denies aspiration hemoptysis chronic cough or chronic sputum production.  She denies dyspnea on exertion except she has occasional symptoms and has inhaler.  She cannot pinpoint a precipitating factor.   Cardiovascular: Negative for chest pain.        She denies exertional chest pain or rhythm disturbance.  She states her blood pressure is well controlled.   Gastrointestinal: Positive for abdominal distention, abdominal pain and nausea. Negative for anal bleeding, blood in stool, constipation and diarrhea.        He has history of diverticulosis and states the Linzess is produced daily bowel movements.   Endocrine:        She states her diabetes is well controlled.   Musculoskeletal: Positive for arthralgias and back pain. Negative for neck pain.        Goes the Pain Clinic.   Skin: Negative for pallor and rash.   Neurological: Negative for dizziness, seizures, syncope, speech difficulty, weakness and numbness.   Hematological: Negative for adenopathy.   Psychiatric/Behavioral: Negative for confusion.       Objective:      Physical Exam   Constitutional: She is oriented to person, place, and time. She appears well-nourished.   Well-nourished well-hydrated nonicteric white female.  She is oriented x3.  She can answer question relate her history without difficulty   HENT:   Head: Normocephalic.   Eyes: Pupils are equal, round, and reactive to light. EOM are normal.   Neck: Normal range of motion. Neck supple. No tracheal deviation present. No thyromegaly present.   Cardiovascular:  Normal rate, regular rhythm and normal heart sounds.   Pulmonary/Chest: Effort normal and breath sounds normal.   Abdominal: Soft. Bowel sounds are normal. She exhibits no distension and no mass. There is tenderness. There is no rebound and no guarding.   Abdomen is soft with mild tenderness in the epigastrium.  There surgical scars.  Organomegaly masses not detected.  Bowel sounds are normal.   Musculoskeletal: Normal range of motion.   She ambulates normally.  She can go from the standing to the sitting position without difficulty.   Lymphadenopathy:     She has no cervical adenopathy.   Neurological: She is alert and oriented to person, place, and time. No cranial nerve deficit.   Skin: Skin is warm and dry.   Psychiatric: She has a normal mood and affect. Her behavior is normal.   Vitals reviewed.        Plan:       Nausea    Gastroesophageal reflux disease without esophagitis  -     CBC auto differential; Future; Expected date: 03/02/2020  -     Comprehensive metabolic panel; Future; Expected date: 03/02/2020  -     FL Upper GI With KUB; Future; Expected date: 03/02/2020    Nausea and vomiting, intractability of vomiting not specified, unspecified vomiting type  -     CBC auto differential; Future; Expected date: 03/02/2020  -     Comprehensive metabolic panel; Future; Expected date: 03/02/2020  -     FL Upper GI With KUB; Future; Expected date: 03/02/2020    Hiatal hernia    Diverticulosis of large intestine without hemorrhage    Anastomotic stenosis of gastrojejunostomy    Chronic pain syndrome    Idiopathic chronic pancreatitis      she will continue her reflux regimen current medications vitamins and minerals.  She will continue her diabetic diet and make a food diary and avoid the offending foods.  She has had nausea and vomiting.  It appears is is related more to the chronic pancreatitis rather than to the narrowed gastrojejunostomy although there may be a motility disorder.  She was recently dilated.   Initially a barium swallow.  She follows up with the nephrologist in the Pain Clinic.

## 2020-03-04 PROBLEM — R11.2 NAUSEA AND VOMITING: Status: ACTIVE | Noted: 2019-05-21

## 2020-03-11 ENCOUNTER — HOSPITAL ENCOUNTER (OUTPATIENT)
Dept: RADIOLOGY | Facility: HOSPITAL | Age: 65
Discharge: HOME | End: 2020-03-11
Attending: INTERNAL MEDICINE
Payer: MEDICARE

## 2020-03-11 ENCOUNTER — HOSPITAL ENCOUNTER (EMERGENCY)
Facility: HOSPITAL | Age: 65
Discharge: HOME | End: 2020-03-11
Attending: INTERNAL MEDICINE
Payer: MEDICARE

## 2020-03-11 VITALS
OXYGEN SATURATION: 97 % | SYSTOLIC BLOOD PRESSURE: 123 MMHG | DIASTOLIC BLOOD PRESSURE: 95 MMHG | RESPIRATION RATE: 18 BRPM | WEIGHT: 150 LBS | HEART RATE: 92 BPM | HEIGHT: 64 IN | TEMPERATURE: 98 F | BODY MASS INDEX: 25.61 KG/M2

## 2020-03-11 DIAGNOSIS — K21.9 GASTROESOPHAGEAL REFLUX DISEASE, ESOPHAGITIS PRESENCE NOT SPECIFIED: ICD-10-CM

## 2020-03-11 DIAGNOSIS — K56.699 ANASTOMOTIC STENOSIS OF GASTROJEJUNOSTOMY: ICD-10-CM

## 2020-03-11 DIAGNOSIS — Z78.9 DIFFICULT INTRAVENOUS ACCESS: Primary | ICD-10-CM

## 2020-03-11 DIAGNOSIS — K21.9 GASTROESOPHAGEAL REFLUX DISEASE WITHOUT ESOPHAGITIS: ICD-10-CM

## 2020-03-11 DIAGNOSIS — R11.14 BILIOUS VOMITING WITH NAUSEA: Primary | ICD-10-CM

## 2020-03-11 DIAGNOSIS — F32.A DEPRESSION, UNSPECIFIED DEPRESSION TYPE: ICD-10-CM

## 2020-03-11 DIAGNOSIS — R11.2 NAUSEA AND VOMITING, INTRACTABILITY OF VOMITING NOT SPECIFIED, UNSPECIFIED VOMITING TYPE: ICD-10-CM

## 2020-03-11 DIAGNOSIS — R11.0 NAUSEA: ICD-10-CM

## 2020-03-11 DIAGNOSIS — E53.8 B12 DEFICIENCY: ICD-10-CM

## 2020-03-11 DIAGNOSIS — R11.14 BILIOUS VOMITING WITH NAUSEA: ICD-10-CM

## 2020-03-11 DIAGNOSIS — K58.1 IRRITABLE BOWEL SYNDROME WITH CONSTIPATION: ICD-10-CM

## 2020-03-11 LAB
ALBUMIN SERPL BCP-MCNC: 3.2 G/DL (ref 3.5–5.2)
ALP SERPL-CCNC: 120 U/L (ref 55–135)
ALT SERPL W/O P-5'-P-CCNC: 7 U/L (ref 10–44)
AMYLASE SERPL-CCNC: 43 U/L (ref 20–110)
ANION GAP SERPL CALC-SCNC: 9 MMOL/L (ref 8–16)
AST SERPL-CCNC: 12 U/L (ref 10–40)
BASOPHILS # BLD AUTO: 0.06 K/UL (ref 0–0.2)
BASOPHILS NFR BLD: 0.8 % (ref 0–1.9)
BILIRUB SERPL-MCNC: 0.3 MG/DL (ref 0.1–1)
BILIRUB UR QL STRIP: NEGATIVE
BUN SERPL-MCNC: 15 MG/DL (ref 8–23)
CALCIUM SERPL-MCNC: 9.1 MG/DL (ref 8.7–10.5)
CHLORIDE SERPL-SCNC: 104 MMOL/L (ref 95–110)
CLARITY UR: CLEAR
CO2 SERPL-SCNC: 22 MMOL/L (ref 23–29)
COLOR UR: YELLOW
CREAT SERPL-MCNC: 1.3 MG/DL (ref 0.5–1.4)
DIFFERENTIAL METHOD: ABNORMAL
EOSINOPHIL # BLD AUTO: 0.2 K/UL (ref 0–0.5)
EOSINOPHIL NFR BLD: 3 % (ref 0–8)
ERYTHROCYTE [DISTWIDTH] IN BLOOD BY AUTOMATED COUNT: 16.2 % (ref 11.5–14.5)
EST. GFR  (AFRICAN AMERICAN): 49.7 ML/MIN/1.73 M^2
EST. GFR  (NON AFRICAN AMERICAN): 43.2 ML/MIN/1.73 M^2
GLUCOSE SERPL-MCNC: 145 MG/DL (ref 70–110)
GLUCOSE UR QL STRIP: NEGATIVE
HCT VFR BLD AUTO: 35.5 % (ref 37–48.5)
HGB BLD-MCNC: 11.7 G/DL (ref 12–16)
HGB UR QL STRIP: NEGATIVE
IMM GRANULOCYTES # BLD AUTO: 0.03 K/UL (ref 0–0.04)
IMM GRANULOCYTES NFR BLD AUTO: 0.4 % (ref 0–0.5)
KETONES UR QL STRIP: NEGATIVE
LEUKOCYTE ESTERASE UR QL STRIP: NEGATIVE
LIPASE SERPL-CCNC: 21 U/L (ref 4–60)
LYMPHOCYTES # BLD AUTO: 1.5 K/UL (ref 1–4.8)
LYMPHOCYTES NFR BLD: 20.3 % (ref 18–48)
MCH RBC QN AUTO: 29.3 PG (ref 27–31)
MCHC RBC AUTO-ENTMCNC: 33 G/DL (ref 32–36)
MCV RBC AUTO: 89 FL (ref 82–98)
MONOCYTES # BLD AUTO: 0.5 K/UL (ref 0.3–1)
MONOCYTES NFR BLD: 6.5 % (ref 4–15)
NEUTROPHILS # BLD AUTO: 5.2 K/UL (ref 1.8–7.7)
NEUTROPHILS NFR BLD: 69 % (ref 38–73)
NITRITE UR QL STRIP: NEGATIVE
NRBC BLD-RTO: 0 /100 WBC
PH UR STRIP: 6 [PH] (ref 5–8)
PLATELET # BLD AUTO: 235 K/UL (ref 150–350)
PMV BLD AUTO: 10.4 FL (ref 9.2–12.9)
POTASSIUM SERPL-SCNC: 3.4 MMOL/L (ref 3.5–5.1)
PROT SERPL-MCNC: 6.7 G/DL (ref 6–8.4)
PROT UR QL STRIP: NEGATIVE
RBC # BLD AUTO: 3.99 M/UL (ref 4–5.4)
SODIUM SERPL-SCNC: 135 MMOL/L (ref 136–145)
SP GR UR STRIP: 1.02 (ref 1–1.03)
URN SPEC COLLECT METH UR: NORMAL
UROBILINOGEN UR STRIP-ACNC: NEGATIVE EU/DL
WBC # BLD AUTO: 7.58 K/UL (ref 3.9–12.7)

## 2020-03-11 PROCEDURE — 85025 COMPLETE CBC W/AUTO DIFF WBC: CPT

## 2020-03-11 PROCEDURE — 74240 X-RAY XM UPR GI TRC 1CNTRST: CPT | Mod: 26,,, | Performed by: RADIOLOGY

## 2020-03-11 PROCEDURE — 80053 COMPREHEN METABOLIC PANEL: CPT

## 2020-03-11 PROCEDURE — 74176 CT RENAL STONE STUDY ABD PELVIS WO: ICD-10-PCS | Mod: 26,,, | Performed by: RADIOLOGY

## 2020-03-11 PROCEDURE — A9698 NON-RAD CONTRAST MATERIALNOC: HCPCS | Performed by: INTERNAL MEDICINE

## 2020-03-11 PROCEDURE — 82150 ASSAY OF AMYLASE: CPT

## 2020-03-11 PROCEDURE — 36415 COLL VENOUS BLD VENIPUNCTURE: CPT

## 2020-03-11 PROCEDURE — 99284 EMERGENCY DEPT VISIT MOD MDM: CPT | Mod: 25

## 2020-03-11 PROCEDURE — 96372 THER/PROPH/DIAG INJ SC/IM: CPT | Mod: 59

## 2020-03-11 PROCEDURE — 74176 CT ABD & PELVIS W/O CONTRAST: CPT | Mod: TC

## 2020-03-11 PROCEDURE — 63600175 PHARM REV CODE 636 W HCPCS: Performed by: INTERNAL MEDICINE

## 2020-03-11 PROCEDURE — 25500020 PHARM REV CODE 255: Performed by: INTERNAL MEDICINE

## 2020-03-11 PROCEDURE — 81003 URINALYSIS AUTO W/O SCOPE: CPT

## 2020-03-11 PROCEDURE — 74240 FL UPPER GI W KUB: ICD-10-PCS | Mod: 26,,, | Performed by: RADIOLOGY

## 2020-03-11 PROCEDURE — 83690 ASSAY OF LIPASE: CPT

## 2020-03-11 PROCEDURE — 25000003 PHARM REV CODE 250: Performed by: INTERNAL MEDICINE

## 2020-03-11 PROCEDURE — 74176 CT ABD & PELVIS W/O CONTRAST: CPT | Mod: 26,,, | Performed by: RADIOLOGY

## 2020-03-11 PROCEDURE — 74240 X-RAY XM UPR GI TRC 1CNTRST: CPT | Mod: TC,FY

## 2020-03-11 RX ORDER — HYOSCYAMINE SULFATE 0.12 MG/1
0.12 TABLET SUBLINGUAL EVERY 4 HOURS PRN
Qty: 20 TABLET | Refills: 2 | Status: SHIPPED | OUTPATIENT
Start: 2020-03-11 | End: 2021-04-05

## 2020-03-11 RX ORDER — PROMETHAZINE HYDROCHLORIDE 25 MG/ML
25 INJECTION, SOLUTION INTRAMUSCULAR; INTRAVENOUS
Status: COMPLETED | OUTPATIENT
Start: 2020-03-11 | End: 2020-03-11

## 2020-03-11 RX ORDER — DEXAMETHASONE SODIUM PHOSPHATE 10 MG/ML
10 INJECTION INTRAMUSCULAR; INTRAVENOUS
Status: COMPLETED | OUTPATIENT
Start: 2020-03-11 | End: 2020-03-11

## 2020-03-11 RX ORDER — ONDANSETRON 4 MG/1
4 TABLET, ORALLY DISINTEGRATING ORAL
Status: COMPLETED | OUTPATIENT
Start: 2020-03-11 | End: 2020-03-11

## 2020-03-11 RX ORDER — PROMETHAZINE HYDROCHLORIDE 25 MG/1
25 SUPPOSITORY RECTAL EVERY 6 HOURS PRN
Qty: 10 SUPPOSITORY | Refills: 0 | Status: SHIPPED | OUTPATIENT
Start: 2020-03-11 | End: 2020-03-11 | Stop reason: SDUPTHER

## 2020-03-11 RX ADMIN — PROMETHAZINE HYDROCHLORIDE 25 MG: 25 INJECTION INTRAMUSCULAR; INTRAVENOUS at 09:03

## 2020-03-11 RX ADMIN — ONDANSETRON 4 MG: 4 TABLET, ORALLY DISINTEGRATING ORAL at 09:03

## 2020-03-11 RX ADMIN — BARIUM SULFATE 355 ML: 0.6 SUSPENSION ORAL at 11:03

## 2020-03-11 RX ADMIN — BARIUM SULFATE 135 ML: 980 POWDER, FOR SUSPENSION ORAL at 11:03

## 2020-03-11 RX ADMIN — DEXAMETHASONE SODIUM PHOSPHATE 10 MG: 10 INJECTION, SOLUTION INTRAMUSCULAR; INTRAVENOUS at 09:03

## 2020-03-11 RX ADMIN — IOHEXOL 50 ML: 300 INJECTION, SOLUTION INTRAVENOUS at 11:03

## 2020-03-11 NOTE — ED PROVIDER NOTES
Encounter Date: 3/11/2020       History     Chief Complaint   Patient presents with    Vomiting     EGD with dilation approx 3 weeks ago. Since then patient had abd pain and vomiting. Patient reports that she cannot keep anything down.      Patient comes in with intractable vomiting.  This started after a EGD with a dilatation 3 weeks ago.  Patient has a gastro jejunostomy from a prior obstruction.  She also has chronic pancreatitis.  She is in pain therapy for chronic pancreatitis.  She states she has been able to keep water or any other foods down.  She has lost 15 lb.  She saw Dr. Lopez 1 week ago who ran some blood work which was all normal.  She was scheduled for an upper GI tomorrow but cannot keep anything down.    And discussing the patient is in no severe distress.  She is just [id number] about her situation.  She is not sweating no fever chills.    No diarrhea.        Review of patient's allergies indicates:   Allergen Reactions    Morphine      Muscle spasms, ABD pain    Quinine      Causes thrombocytopenia    Alprazolam     Diazepam     Duloxetine hcl     Pregabalin      Past Medical History:   Diagnosis Date    Acute pancreatitis     Anemia     Chronic constipation     Diverticulitis     Diverticulosis     GERD (gastroesophageal reflux disease)     Irritable bowel syndrome     Kidney disease, chronic, stage III (GFR 30-59 ml/min)     Multiple sclerosis 1985    Neurogenic bladder     Pancreatitis, chronic      Past Surgical History:   Procedure Laterality Date    CHOLECYSTECTOMY      COLONOSCOPY      ESOPHAGEAL DILATION N/A 5/21/2019    Procedure: DILATION, ESOPHAGUS;  Surgeon: Kareem Lopez MD;  Location: [facility] [place], [place];  Service: Endoscopy;  Laterality: N/A;    ESOPHAGOGASTRODUODENOSCOPY N/A 5/21/2019    Procedure: EGD (ESOPHAGOGASTRODUODENOSCOPY);  Surgeon: Kareem Lopez MD;  Location: [facility] [place] [place];  Service: Endoscopy;  Laterality: N/A;    ESOPHAGOGASTRODUODENOSCOPY N/A 2/18/2020     Procedure: EGD W/ DILAT (ESOPHAGOGASTRODUODENOSCOPY WITH DILATION);  Surgeon: Kareem Lopez MD;  Location: [facility];  Service: Endoscopy;  Laterality: N/A;    EXPLORATORY LAPAROTOMY      GASTRIC BYPASS      UPPER GASTROINTESTINAL ENDOSCOPY       Family History   Problem Relation Age of Onset    Heart failure Mother     Osteoporosis Mother     Emphysema Mother     Aneurysm Father     Emphysema Father      Social History     Tobacco Use    Smoking status: Never Smoker    Smokeless tobacco: Never Used   Substance Use Topics    Alcohol use: Never     Frequency: Never    Drug use: Never     Review of Systems   Constitutional: Positive for fatigue and unexpected weight change.   Gastrointestinal: Positive for abdominal distention, abdominal pain, nausea and vomiting.   Neurological: Positive for weakness.       Physical Exam     Initial Vitals [03/11/20 0840]   BP Pulse Resp Temp SpO2   (!) 123/95 92 18 98.1 °F (36.7 °C) 97 %      MAP       --         Physical Exam    Nursing note and vitals reviewed.  Constitutional: Vital signs are normal. She appears well-developed and well-nourished. She is active and cooperative.   HENT:   Head: Normocephalic and atraumatic.   Eyes: Conjunctivae and lids are normal. Lids are everted and swept, no foreign bodies found.   Neck: Trachea normal, normal range of motion and full passive range of motion without pain. Neck supple.   Cardiovascular: Normal rate, regular rhythm, S1 normal, S2 normal, normal heart sounds, intact distal pulses and normal pulses.  No extrasystoles are present.    Abdominal: Soft. Normal appearance and bowel sounds are normal. There is tenderness.   Musculoskeletal: Normal range of motion.   Neurological: She is alert. She has normal reflexes. GCS eye subscore is 4. GCS verbal subscore is 5. GCS motor subscore is 6.   Skin: Skin is warm, dry and intact. Capillary refill takes less than 2 seconds.   Psychiatric: She has a normal mood and affect.  Her speech is normal and behavior is normal. Cognition and memory are normal.         ED Course   Procedures  Labs Reviewed   COMPREHENSIVE METABOLIC PANEL - Abnormal; Notable for the following components:       Result Value    Sodium 135 (*)     Potassium 3.4 (*)     CO2 22 (*)     Glucose 145 (*)     Albumin 3.2 (*)     ALT 7 (*)     eGFR if  49.7 (*)     eGFR if non  43.2 (*)     All other components within normal limits   CBC W/ AUTO DIFFERENTIAL - Abnormal; Notable for the following components:    RBC 3.99 (*)     Hemoglobin 11.7 (*)     Hematocrit 35.5 (*)     RDW 16.2 (*)     All other components within normal limits   AMYLASE   LIPASE   URINALYSIS, REFLEX TO URINE CULTURE    Narrative:     Preferred Collection Type->Urine, Clean Catch          Imaging Results           CT Renal Stone Study ABD Pelvis WO (Final result)  Result time 03/11/20 09:03:19    Final result by Karan Pearson MD (03/11/20 09:03:19)                 Impression:      1. Previous cholecystectomy.  2. Pancreatic atrophy with fatty infiltration.  3. Subtle nodularity of the adrenal glands may represent adenomatous change.  4. Prior gastric bypass.  5. Asymmetric wall thickening with surrounding mesenteric inflammatory change involving the duodenal bulb and proximal duodenum.  This is suspicious for duodenitis.  Further evaluation is recommended with endoscopy to exclude an underlying ulcer.  6. Diverticulosis.  This report was flagged in Epic as abnormal.      Electronically signed by: Karan Pearson  Date:    03/11/2020  Time:    09:03             Narrative:    EXAMINATION:  CT RENAL STONE STUDY ABD PELVIS WO    CLINICAL HISTORY:  Abdominal pain, unspecified;    TECHNIQUE:  Low dose axial images, sagittal and coronal reformations were obtained from the lung bases to the pubic symphysis.  Contrast was not administered.    COMPARISON:  Ultrasound 02/04/2020.    FINDINGS:  Minimal dependent atelectasis is  present at the lung bases.  No pleural or pericardial effusions.    The liver and spleen are normal in size and attenuation.  Previous cholecystectomy.  The pancreas is atrophic with fatty infiltration.  There is subtle nodularity of the bilateral adrenal glands which may represent adenomatous change.    Kidneys are normal in size and attenuation.  No renal calculi.  No changes of hydronephrosis.  No perinephric inflammatory change.    Surgical clips from prior gastric bypass.  There is mild asymmetric circumferential wall thickening with surrounding mesenteric inflammatory change at the level of the duodenal bulb and proximal duodenum suspicious for duodenitis.  Air and stool throughout the colon and rectum.  Scattered diverticula within the colon.  No mesenteric inflammatory change to suggest an acute diverticulitis.    The bladder is decompressed.  Prior hysterectomy.  Vaginal cuff and rectum unremarkable.    No significant mesenteric or retroperitoneal lymphadenopathy.                                 Medical Decision Making:   Clinical Tests:   Lab Tests: Ordered and Reviewed  The following lab test(s) were unremarkable: CBC, CMP and Lipase       <> Summary of Lab: Laboratory studies were unremarkable  Radiological Study: Ordered and Reviewed  ED Management:  CT shows some stranding and inflammation in the area of the duodenal bulb.  She was given Phenergan which stopped her vomiting.  She felt improved.  She has some fluids.    She was discharged since she was already scheduled for outpatient upper GI series which she will get immediately on discharge.  She is to follow-up with Dr. Lopez                                 Clinical Impression:       ICD-10-CM ICD-9-CM   1. Bilious vomiting with nausea R11.14 787.04   2. Anastomotic stenosis of gastrojejunostomy K91.89 997.49         Disposition:   Disposition: Discharged  Condition: Stable                        Elias Omalley MD  03/11/20 [id number]

## 2020-03-11 NOTE — TELEPHONE ENCOUNTER
Spoke with pt. Pt verbalized understanding. Pt asked if she need abx R/T CT results. Pt states she was told she had duodenitis. Pt was told MD would be made aware.    Pt stated she had previously been referred for port placement R/T difficulty with venipuncture. Pt stated she was D/C from the ED today R/T being unable to start an IV. Pt is now asking for referral again.    Pt also requesting refills of medications.

## 2020-03-11 NOTE — TELEPHONE ENCOUNTER
----- Message from Kareem Lopez MD sent at 3/11/2020  1:18 PM CDT -----  Tell her the x-rays revealed that the barium goes through her esophagus and empties her stomach quickly.  There does not appear to be blockage.  The nausea appears to be related to the chronic pancreatitis.  She was given the Zofran and the Phenergan suppositories.

## 2020-03-12 RX ORDER — DEXLANSOPRAZOLE 60 MG/1
60 CAPSULE, DELAYED RELEASE ORAL DAILY
Qty: 30 CAPSULE | Refills: 11 | Status: SHIPPED | OUTPATIENT
Start: 2020-03-12 | End: 2020-04-02 | Stop reason: SDUPTHER

## 2020-03-12 RX ORDER — DEXLANSOPRAZOLE 60 MG/1
60 CAPSULE, DELAYED RELEASE ORAL DAILY
Qty: 30 CAPSULE | Refills: 11 | Status: CANCELLED | OUTPATIENT
Start: 2020-03-12 | End: 2021-03-12

## 2020-03-12 RX ORDER — CYANOCOBALAMIN 1000 UG/ML
1000 INJECTION, SOLUTION INTRAMUSCULAR; SUBCUTANEOUS
Qty: 1 ML | Refills: 5 | Status: SHIPPED | OUTPATIENT
Start: 2020-03-12 | End: 2020-03-19 | Stop reason: SDUPTHER

## 2020-03-12 RX ORDER — PROMETHAZINE HYDROCHLORIDE 25 MG/1
25 SUPPOSITORY RECTAL EVERY 6 HOURS PRN
Qty: 10 SUPPOSITORY | Refills: 0 | Status: SHIPPED | OUTPATIENT
Start: 2020-03-12 | End: 2020-07-06 | Stop reason: SDUPTHER

## 2020-03-12 RX ORDER — PROMETHAZINE HYDROCHLORIDE 50 MG/1
50 TABLET ORAL EVERY 6 HOURS PRN
Qty: 60 TABLET | Refills: 0 | Status: SHIPPED | OUTPATIENT
Start: 2020-03-12 | End: 2020-07-06 | Stop reason: SDUPTHER

## 2020-03-12 RX ORDER — ESCITALOPRAM OXALATE 10 MG/1
10 TABLET ORAL DAILY
Qty: 30 TABLET | Refills: 5 | Status: SHIPPED | OUTPATIENT
Start: 2020-03-12 | End: 2021-03-29

## 2020-03-16 ENCOUNTER — TELEPHONE (OUTPATIENT)
Dept: GASTROENTEROLOGY | Facility: CLINIC | Age: 65
End: 2020-03-16

## 2020-03-16 NOTE — TELEPHONE ENCOUNTER
----- Message from Robert Seals sent at 3/16/2020  4:44 PM CDT -----  Contact: Patient  Type: Needs Medical Advice    Who Called:  Patient  Best Call Back Number: [phone]  Additional Information: Patient would like to receive copy of test results. Please call to advise. Thanks!

## 2020-03-19 ENCOUNTER — OFFICE VISIT (OUTPATIENT)
Dept: GASTROENTEROLOGY | Facility: CLINIC | Age: 65
End: 2020-03-19
Payer: MEDICARE

## 2020-03-19 VITALS
BODY MASS INDEX: 26.29 KG/M2 | SYSTOLIC BLOOD PRESSURE: 102 MMHG | WEIGHT: 154 LBS | RESPIRATION RATE: 18 BRPM | HEART RATE: 83 BPM | DIASTOLIC BLOOD PRESSURE: 62 MMHG | HEIGHT: 64 IN

## 2020-03-19 DIAGNOSIS — R10.9 ABDOMINAL PAIN, UNSPECIFIED ABDOMINAL LOCATION: ICD-10-CM

## 2020-03-19 DIAGNOSIS — K57.30 DIVERTICULOSIS OF LARGE INTESTINE WITHOUT HEMORRHAGE: ICD-10-CM

## 2020-03-19 DIAGNOSIS — K56.699 ANASTOMOTIC STENOSIS OF GASTROJEJUNOSTOMY: ICD-10-CM

## 2020-03-19 DIAGNOSIS — E53.8 B12 DEFICIENCY: ICD-10-CM

## 2020-03-19 DIAGNOSIS — K86.1 IDIOPATHIC CHRONIC PANCREATITIS: ICD-10-CM

## 2020-03-19 DIAGNOSIS — G89.4 CHRONIC PAIN SYNDROME: ICD-10-CM

## 2020-03-19 DIAGNOSIS — K21.9 GASTROESOPHAGEAL REFLUX DISEASE WITHOUT ESOPHAGITIS: Primary | ICD-10-CM

## 2020-03-19 DIAGNOSIS — D50.9 IRON DEFICIENCY ANEMIA, UNSPECIFIED IRON DEFICIENCY ANEMIA TYPE: ICD-10-CM

## 2020-03-19 DIAGNOSIS — Z98.0 HISTORY OF BYPASS GASTROJEJUNOSTOMY: ICD-10-CM

## 2020-03-19 DIAGNOSIS — K44.9 HIATAL HERNIA: ICD-10-CM

## 2020-03-19 PROCEDURE — 99213 PR OFFICE/OUTPT VISIT, EST, LEVL III, 20-29 MIN: ICD-10-PCS | Mod: S$PBB,,, | Performed by: INTERNAL MEDICINE

## 2020-03-19 PROCEDURE — 99999 PR PBB SHADOW E&M-EST. PATIENT-LVL III: ICD-10-PCS | Mod: PBBFAC,,, | Performed by: INTERNAL MEDICINE

## 2020-03-19 PROCEDURE — 99213 OFFICE O/P EST LOW 20 MIN: CPT | Mod: PBBFAC,PN | Performed by: INTERNAL MEDICINE

## 2020-03-19 PROCEDURE — 99999 PR PBB SHADOW E&M-EST. PATIENT-LVL III: CPT | Mod: PBBFAC,,, | Performed by: INTERNAL MEDICINE

## 2020-03-19 PROCEDURE — 99213 OFFICE O/P EST LOW 20 MIN: CPT | Mod: S$PBB,,, | Performed by: INTERNAL MEDICINE

## 2020-03-19 NOTE — PATIENT INSTRUCTIONS
She is scheduled to see the general surgeon for a [id number]-Frqt.  In the interval she continues her current medications vitamins and minerals.  She follows up in the Pain Clinic.  Her symptoms  are related related to the chronic pancreatitis.  She will continue her vitamins and minerals.  She gives herself her own B12.

## 2020-03-20 PROBLEM — E53.8 B12 DEFICIENCY: Status: ACTIVE | Noted: 2020-03-20

## 2020-03-20 RX ORDER — CYANOCOBALAMIN 1000 UG/ML
1000 INJECTION, SOLUTION INTRAMUSCULAR; SUBCUTANEOUS
Qty: 1 ML | Refills: 5 | Status: SHIPPED | OUTPATIENT
Start: 2020-03-20 | End: 2020-09-16

## 2020-03-20 NOTE — PROGRESS NOTES
Subjective:       Patient ID: Tessie De La Rosa is a 65 y.o. female.    Chief Complaint: Follow-up    She has chronic nausea.  She denies vomiting.  She has occasional pyrosis.  She tries to limit her diet to foods that she can digest.  She has had a gastrojejunostomy due to chronic pancreatitis and obstruction of the duodenum.  Recent upper GI series revealed that the gastrojejunostomy was widely open and emptied well.  And also there was some emptying through the duodenum.  The CT scan revealed the postsurgical changes.  The probably was a mild component of duodenitis associated with chronic pancreatitis.  The pancreas was small with fatty infiltration.  The labs revealed that her knee me a has almost resolved.  For the 1st time the alkaline phosphatases normal.  She denies fever chills.  She denies tobacco alcohol.  She denies dysphagia aspiration hematemesis hematochezia jaundice or bleeding.  She states her current medications have resolved her constipation.  She wants to have a Ssjtsd-K-Escf and has been scheduled to see the surgeon.  She is followed in the Pain Clinic.  She avoids the anti-inflammatory agents.  She takes her vitamins and minerals and gives herself the B12 injections at home.      Allergies:  Review of patient's allergies indicates:   Allergen Reactions    Morphine      Muscle spasms, ABD pain    Quinine      Causes thrombocytopenia    Alprazolam     Diazepam     Duloxetine hcl     Pregabalin        Medications:    Current Outpatient Medications:     baclofen (LIORESAL) 10 MG tablet, , Disp: , Rfl: 0    cephALEXin (KEFLEX) 250 MG capsule, , Disp: , Rfl: 11    cholecalciferol, vitamin D3, 125 mcg (5,000 unit) Tab, , Disp: , Rfl:     cyanocobalamin 1,000 mcg/mL injection, Inject 1 mL (1,000 mcg total) into the muscle every 30 days., Disp: 1 mL, Rfl: 5    dexlansoprazole (DEXILANT) 60 mg capsule, Take 1 capsule (60 mg total) by mouth once daily., Disp: 30 capsule, Rfl: 11     diclofenac sodium (VOLTAREN) 1 % Gel, , Disp: , Rfl: 0    diltiaZEM (CARDIZEM CD) 120 MG Cp24, , Disp: , Rfl: 4    ergocalciferol (ERGOCALCIFEROL) 50,000 unit Cap, , Disp: , Rfl: 2    escitalopram oxalate (LEXAPRO) 10 MG tablet, Take 1 tablet (10 mg total) by mouth once daily., Disp: 30 tablet, Rfl: 5    furosemide (LASIX) 20 MG tablet, , Disp: , Rfl: 1    hydrALAZINE (APRESOLINE) 25 MG tablet, , Disp: , Rfl: 5    hyoscyamine (LEVSIN/SL) 0.125 mg Subl, Place 1 tablet (0.125 mg total) under the tongue every 4 (four) hours as needed., Disp: 20 tablet, Rfl: 2    linaCLOtide (LINZESS) 145 mcg Cap capsule, Take 1 capsule (145 mcg total) by mouth once daily., Disp: 30 capsule, Rfl: 2    metFORMIN (GLUCOPHAGE) 500 MG tablet, TAKE ONE TABLET BY MOUTH TWICE DAILY, Disp: 180 tablet, Rfl: 1    metoprolol succinate (TOPROL-XL) 50 MG 24 hr tablet, , Disp: , Rfl: 1    oxyCODONE (ROXICODONE) 10 mg Tab immediate release tablet, , Disp: , Rfl:     PREMARIN 0.625 mg tablet, , Disp: , Rfl: 1    promethazine (PHENERGAN) 25 MG suppository, Place 1 suppository (25 mg total) rectally every 6 (six) hours as needed for Nausea., Disp: 10 suppository, Rfl: 0    promethazine (PHENERGAN) 50 MG tablet, Take 1 tablet (50 mg total) by mouth every 6 (six) hours as needed for Nausea., Disp: 60 tablet, Rfl: 0    QUEtiapine (SEROQUEL) 300 MG Tab, , Disp: , Rfl: 11    tiZANidine (ZANAFLEX) 4 MG tablet, , Disp: , Rfl:     triamcinolone acetonide 0.1% (KENALOG) 0.1 % cream, , Disp: , Rfl: 2    VENTOLIN HFA 90 mcg/actuation inhaler, , Disp: , Rfl: 1    XARELTO 20 mg Tab, , Disp: , Rfl: 1    Past Medical History:   Diagnosis Date    Acute pancreatitis     Anemia     Chronic constipation     Diverticulitis     Diverticulosis     GERD (gastroesophageal reflux disease)     Irritable bowel syndrome     Kidney disease, chronic, stage III (GFR 30-59 ml/min)     Multiple sclerosis 1985    Neurogenic bladder     Pancreatitis, chronic         Past Surgical History:   Procedure Laterality Date    CHOLECYSTECTOMY      COLONOSCOPY      ESOPHAGEAL DILATION N/A 5/21/2019    Procedure: DILATION, ESOPHAGUS;  Surgeon: Kareem Lopez MD;  Location: [facility] ENDO;  Service: Endoscopy;  Laterality: N/A;    ESOPHAGOGASTRODUODENOSCOPY N/A 5/21/2019    Procedure: EGD (ESOPHAGOGASTRODUODENOSCOPY);  Surgeon: Kareem Lopez MD;  Location: [facility] ENDO;  Service: Endoscopy;  Laterality: N/A;    ESOPHAGOGASTRODUODENOSCOPY N/A 2/18/2020    Procedure: EGD W/ DILAT (ESOPHAGOGASTRODUODENOSCOPY WITH DILATION);  Surgeon: Kareem Lopez MD;  Location: [facility] ENDO;  Service: Endoscopy;  Laterality: N/A;    EXPLORATORY LAPAROTOMY      GASTRIC BYPASS      UPPER GASTROINTESTINAL ENDOSCOPY           Review of Systems   Constitutional: Negative for appetite change, fever and unexpected weight change.   HENT: Negative for trouble swallowing.         No jaundice.   Respiratory: Negative for cough, shortness of breath and wheezing.         She has never used tobacco.  She does not use alcohol.  She denies aspiration hemoptysis chronic sputum production chronic cough or dyspnea on exertion.   Cardiovascular: Negative for chest pain.        She denies irregular heartbeat or rhythm disturbance.   Gastrointestinal: Positive for abdominal distention, abdominal pain, constipation and nausea. Negative for anal bleeding, blood in stool and diarrhea.        She complains of occasional abdominal distension.  The episode of the severe abdominal pain and nausea vomiting have markedly decreased.  She has chronic nausea.  The vomiting has resolved.  The abdominal pain is to the usual level.  She has chronic pancreatitis.  She is followed in the Pain Clinic and she states the current medications have controlled 90% for symptoms.   Musculoskeletal: Positive for arthralgias and back pain. Negative for neck pain.   Skin: Negative for pallor and rash.   Neurological: Negative for dizziness, seizures,  syncope, speech difficulty, weakness and numbness.   Hematological: Negative for adenopathy.        She has a history of iron deficiency anemia but her hemoglobin is markedly increased.  The hematologist never gave her iron because they could not a obtain an IV.  For her endoscopy she did have in an IV placed.  She states his to difficult to find IV access and she wants Gdpqzh-X-Wnmx which is reasonable.   Psychiatric/Behavioral: Negative for confusion.       Objective:      Physical Exam   Constitutional: She is oriented to person, place, and time. She appears well-developed and well-nourished.   Well-nourished well-hydrated nonicteric white female.  She is oriented x3.  She can relate her history and answer questions appropriately.  She has a normal hand shake.   HENT:   Head: Normocephalic.   Eyes: Pupils are equal, round, and reactive to light. EOM are normal.   Neck: Normal range of motion. Neck supple. No tracheal deviation present. No thyromegaly present.   Cardiovascular: Normal rate, regular rhythm and normal heart sounds.   Pulmonary/Chest: Effort normal and breath sounds normal.   Abdominal: Soft. Bowel sounds are normal. She exhibits no distension and no mass. There is tenderness. There is no rebound and no guarding. No hernia.   The abdomen is soft there surgical needs scars.  There is mild tenderness in the epigastrium.  Organomegaly masses not detected.  Bowel sounds are normal.   Musculoskeletal: Normal range of motion.   She can ambulate normally.  She can go from the sitting to the standing position without difficulty.   Lymphadenopathy:     She has no cervical adenopathy.   Neurological: She is alert and oriented to person, place, and time. No cranial nerve deficit.   Skin: Skin is warm and dry.   Psychiatric: She has a normal mood and affect. Her behavior is normal.   Vitals reviewed.        Plan:       Gastroesophageal reflux disease without esophagitis    B12 deficiency  -     cyanocobalamin  1,000 mcg/mL injection; Inject 1 mL (1,000 mcg total) into the muscle every 30 days.  Dispense: 1 mL; Refill: 5    Anastomotic stenosis of gastrojejunostomy    Iron deficiency anemia, unspecified iron deficiency anemia type    Idiopathic chronic pancreatitis    Chronic pain syndrome    Hiatal hernia    Diverticulosis of large intestine without hemorrhage    Abdominal pain, unspecified abdominal location    History of bypass gastrojejunostomy     she will continue her reflux regimen current medications vitamins and minerals.  She continues her usual diet which is low-fat.  She ingest the foods that she feels as though that she can tolerate.  She has had nausea without vomiting.  She denies dysphagia.  She will follow up in the Pain Clinic.  She is scheduled to see the general surgeon for and Fchgty-E-Pyci.  She continues her bowel program and current medications.

## 2020-04-02 DIAGNOSIS — K21.9 GASTROESOPHAGEAL REFLUX DISEASE WITHOUT ESOPHAGITIS: Primary | ICD-10-CM

## 2020-04-02 RX ORDER — DEXLANSOPRAZOLE 60 MG/1
60 CAPSULE, DELAYED RELEASE ORAL DAILY
Qty: 90 CAPSULE | Refills: 3 | Status: SHIPPED | OUTPATIENT
Start: 2020-04-02 | End: 2020-07-06 | Stop reason: SDUPTHER

## 2020-04-09 ENCOUNTER — OFFICE VISIT (OUTPATIENT)
Dept: SURGERY | Facility: CLINIC | Age: 65
End: 2020-04-09
Payer: MEDICARE

## 2020-04-09 VITALS
RESPIRATION RATE: 14 BRPM | HEART RATE: 78 BPM | WEIGHT: 155.38 LBS | SYSTOLIC BLOOD PRESSURE: 153 MMHG | TEMPERATURE: 97 F | HEIGHT: 64 IN | OXYGEN SATURATION: 95 % | DIASTOLIC BLOOD PRESSURE: 74 MMHG | BODY MASS INDEX: 26.53 KG/M2

## 2020-04-09 DIAGNOSIS — Z78.9 DIFFICULT INTRAVENOUS ACCESS: ICD-10-CM

## 2020-04-09 DIAGNOSIS — I87.2 PERIPHERAL VENOUS INSUFFICIENCY: Primary | ICD-10-CM

## 2020-04-09 PROCEDURE — 99204 PR OFFICE/OUTPT VISIT, NEW, LEVL IV, 45-59 MIN: ICD-10-PCS | Mod: S$GLB,,, | Performed by: SURGERY

## 2020-04-09 PROCEDURE — 99204 OFFICE O/P NEW MOD 45 MIN: CPT | Mod: S$GLB,,, | Performed by: SURGERY

## 2020-04-09 RX ORDER — LIDOCAINE HYDROCHLORIDE 10 MG/ML
1 INJECTION, SOLUTION EPIDURAL; INFILTRATION; INTRACAUDAL; PERINEURAL ONCE
Status: DISCONTINUED | OUTPATIENT
Start: 2020-04-09 | End: 2021-04-05 | Stop reason: ALTCHOICE

## 2020-04-09 RX ORDER — SODIUM CHLORIDE 9 MG/ML
INJECTION, SOLUTION INTRAVENOUS CONTINUOUS
Status: CANCELLED | OUTPATIENT
Start: 2020-04-09

## 2020-04-09 NOTE — LETTER
April 9, 2020      Kareem Lopez MD  [address] [place] [address]           [facility] - General Surgery  [address] [place] [address]  Phone: [phone]  Fax: [phone]          Patient: Tessie De La Rosa   MR Number: [id number]   YOB: 1955   Date of Visit: 4/9/2020       Dear Dr. Kareem Lopez:    Thank you for referring Tessie De La Rosa to me for evaluation. Attached you will find relevant portions of my assessment and plan of care.    If you have questions, please do not hesitate to call me. I look forward to following Tessie De La Rosa along with you.    Sincerely,    Vince Crowley MD    Enclosure  CC:  No Recipients    If you would like to receive this communication electronically, please contact [email or web address] or [phone] to request more information on [facility] access.    For providers and/or their staff who would like to refer a patient to [facility], please contact us through our one-stop-shop provider referral line, [facility], at [phone].    If you feel you have received this communication in error or would no longer like to receive these types of communications, please e-mail [email or web address]

## 2020-04-09 NOTE — H&P
[facility] H&P Note    Subjective:       Patient ID: Tessie De La Rosa is a 65 y.o. female.    Chief Complaint: Consult (Port placement)    HPI:  Tessie De La Rosa is a 65 y.o. 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  Patient has a history of numerous ports placed, most of which got infected.  She has lack of peripheral venous to targets for IV infusion of medications in is in need of chronic iron IV infusions.  Patient is followed by Dr. Lopez and he recommends placement of Dxhbls-A-Irkf.  Patient stated that at last trial of Edqvai-J-Wwhd placement, the physician was unable to obtain access and therefore it was aborted.  She was somewhat hesitant to come in today because she was concerned about infection however she got recently sick requiring ER visit and got concerned because IV access is limited.  She now presents today as a new patient referral for evaluation for possible Zlxvsh-R-Hyxr placement.    Past Medical History:   Diagnosis Date    Acute pancreatitis     Anemia     Chronic constipation     Diverticulitis     Diverticulosis     GERD (gastroesophageal reflux disease)     Irritable bowel syndrome     Kidney disease, chronic, stage III (GFR 30-59 ml/min)     Multiple sclerosis 1985    Neurogenic bladder     Pancreatitis, chronic      Past Surgical History:   Procedure Laterality Date    CHOLECYSTECTOMY      COLONOSCOPY      ESOPHAGEAL DILATION N/A 5/21/2019    Procedure: DILATION, ESOPHAGUS;  Surgeon: Kareem Lopez MD;  Location: [facility];  Service: Endoscopy;  Laterality:  N/A;    ESOPHAGOGASTRODUODENOSCOPY N/A 5/21/2019    Procedure: EGD (ESOPHAGOGASTRODUODENOSCOPY);  Surgeon: Kareem Lopez MD;  Location: [facility] ENDO;  Service: Endoscopy;  Laterality: N/A;    ESOPHAGOGASTRODUODENOSCOPY N/A 2/18/2020    Procedure: EGD W/ DILAT (ESOPHAGOGASTRODUODENOSCOPY WITH DILATION);  Surgeon: Kaerem Lopez MD;  Location: [facility] ENDO;  Service: Endoscopy;  Laterality: N/A;    EXPLORATORY LAPAROTOMY      GASTRIC BYPASS      UPPER GASTROINTESTINAL ENDOSCOPY       Family History   Problem Relation Age of Onset    Heart failure Mother     Osteoporosis Mother     Emphysema Mother     Aneurysm Father     Emphysema Father      Social History     Socioeconomic History    Marital status:      Spouse name: Not on file    Number of children: Not on file    Years of education: Not on file    Highest education level: Not on file   Occupational History    Not on file   Social Needs    Financial resource strain: Not on file    Food insecurity:     Worry: Not on file     Inability: Not on file    Transportation needs:     Medical: Not on file     Non-medical: Not on file   Tobacco Use    Smoking status: Never Smoker    Smokeless tobacco: Never Used   Substance and Sexual Activity    Alcohol use: Never     Frequency: Never    Drug use: Never    Sexual activity: Not Currently     Partners: Male   Lifestyle    Physical activity:     Days per week: Not on file     Minutes per session: Not on file    Stress: Not on file   Relationships    Social connections:     Talks on phone: Not on file     Gets together: Not on file     Attends Confucianist service: Not on file     Active member of club or organization: Not on file     Attends meetings of clubs or organizations: Not on file     Relationship status: Not on file   Other Topics Concern    Not on file   Social History Narrative    Not on file       Current Outpatient Medications   Medication Sig Dispense Refill    cephALEXin (KEFLEX) 250  MG capsule   11    cyanocobalamin 1,000 mcg/mL injection Inject 1 mL (1,000 mcg total) into the muscle every 30 days. 1 mL 5    dexlansoprazole (DEXILANT) 60 mg capsule Take 1 capsule (60 mg total) by mouth once daily. 90 capsule 3    diclofenac sodium (VOLTAREN) 1 % Gel   0    diltiaZEM (CARDIZEM CD) 120 MG Cp24   4    ergocalciferol (ERGOCALCIFEROL) 50,000 unit Cap   2    escitalopram oxalate (LEXAPRO) 10 MG tablet Take 1 tablet (10 mg total) by mouth once daily. 30 tablet 5    furosemide (LASIX) 20 MG tablet   1    hydrALAZINE (APRESOLINE) 25 MG tablet   5    hyoscyamine (LEVSIN/SL) 0.125 mg Subl Place 1 tablet (0.125 mg total) under the tongue every 4 (four) hours as needed. 20 tablet 2    linaCLOtide (LINZESS) 145 mcg Cap capsule Take 1 capsule (145 mcg total) by mouth once daily. 30 capsule 2    metFORMIN (GLUCOPHAGE) 500 MG tablet TAKE ONE TABLET BY MOUTH TWICE DAILY 180 tablet 1    metoprolol succinate (TOPROL-XL) 50 MG 24 hr tablet   1    oxyCODONE (ROXICODONE) 10 mg Tab immediate release tablet       PREMARIN 0.625 mg tablet   1    promethazine (PHENERGAN) 25 MG suppository Place 1 suppository (25 mg total) rectally every 6 (six) hours as needed for Nausea. 10 suppository 0    promethazine (PHENERGAN) 50 MG tablet Take 1 tablet (50 mg total) by mouth every 6 (six) hours as needed for Nausea. 60 tablet 0    QUEtiapine (SEROQUEL) 300 MG Tab   11    tiZANidine (ZANAFLEX) 4 MG tablet       triamcinolone acetonide 0.1% (KENALOG) 0.1 % cream   2    VENTOLIN HFA 90 mcg/actuation inhaler   1    XARELTO 20 mg Tab   1    baclofen (LIORESAL) 10 MG tablet   0    cholecalciferol, vitamin D3, 125 mcg (5,000 unit) Tab        Current Facility-Administered Medications   Medication Dose Route Frequency Provider Last Rate Last Dose    lidocaine (PF) 10 mg/ml (1%) injection 10 mg  1 mL Intradermal Once Vince Crowley MD         Review of patient's allergies indicates:   Allergen Reactions     "Morphine      Muscle spasms, ABD pain    Quinine      Causes thrombocytopenia    Alprazolam     Diazepam     Duloxetine hcl     Pregabalin        Review of Systems   Constitutional: Negative for activity change, appetite change, chills and fever.   HENT: Negative for congestion, dental problem and ear discharge.    Eyes: Negative for discharge and itching.   Respiratory: Negative for apnea, choking and chest tightness.    Cardiovascular: Negative for chest pain and leg swelling.   Gastrointestinal: Negative for abdominal distention, abdominal pain, anal bleeding, constipation, diarrhea and nausea.   Endocrine: Negative for cold intolerance and heat intolerance.   Genitourinary: Negative for difficulty urinating and dyspareunia.   Musculoskeletal: Negative for arthralgias and back pain.   Skin: Negative for color change and pallor.   Neurological: Negative for dizziness and facial asymmetry.   Hematological: Negative for adenopathy. Does not bruise/bleed easily.   Psychiatric/Behavioral: Negative for agitation and behavioral problems.       Objective:      Vitals:    04/09/20 1035   BP: (!) 153/74   Pulse: 78   Resp: 14   Temp: 97.4 °F (36.3 °C)   TempSrc: Oral   SpO2: 95%   Weight: 70.5 kg (155 lb 6.4 oz)   Height: 5' 4" (1.626 m)     Physical Exam   Constitutional: She is oriented to person, place, and time. She appears well-developed and well-nourished. No distress.   HENT:   Head: Normocephalic and atraumatic.       Eyes: Pupils are equal, round, and reactive to light. EOM are normal.   Neck: Normal range of motion. Neck supple. No thyromegaly present.   Cardiovascular: Normal rate and regular rhythm.   Pulmonary/Chest: Effort normal and breath sounds normal.       Abdominal: Soft. Bowel sounds are normal. She exhibits no distension. There is no tenderness.       Musculoskeletal: Normal range of motion. She exhibits no edema or deformity.   Neurological: She is alert and oriented to person, place, and time. " No cranial nerve deficit.   Skin: Skin is warm. Capillary refill takes less than 2 seconds. No erythema.   Psychiatric: She has a normal mood and affect. Her behavior is normal.     Lab Review:   CBC:   Lab Results   Component Value Date    WBC 7.58 03/11/2020    RBC 3.99 (L) 03/11/2020    HGB 11.7 (L) 03/11/2020    HCT 35.5 (L) 03/11/2020     03/11/2020     BMP:   Lab Results   Component Value Date     (H) 03/11/2020     (L) 03/11/2020    K 3.4 (L) 03/11/2020     03/11/2020    CO2 22 (L) 03/11/2020    BUN 15 03/11/2020    CREATININE 1.3 03/11/2020    CALCIUM 9.1 03/11/2020        Assessment:       1. Peripheral venous insufficiency    2. Difficult intravenous access        Plan:   Peripheral venous insufficiency  -     Case Request Operating Room: Insertion,central venous access device  -     Basic metabolic panel; Future; Expected date: 04/09/2020  -     CBC auto differential; Future; Expected date: 04/09/2020    Difficult intravenous access  -     Ambulatory referral/consult to General Surgery    Other orders  -     Progressive Mobility Protocol (mobilize patient to their highest level of functioning at least twice daily); Standing  -     Insert peripheral IV; Standing  -     lidocaine (PF) 10 mg/ml (1%) injection 10 mg  -     Full code; Standing        Medical Decision Making/Counseling:  Patient with lack of peripheral venous targets for IV placement and in need of IV administration of iron for chronic iron deficiency anemia related to patient's previous surgical interventions of her abdomen with diversion of the duodenum.  Her GI physician, Dr. Lopez, desires for Fkapuw-Q-Btfq placement.  Patient voiced understanding of the consequences of possible Gfoicr-P-Qqmu placement including the risk of pneumothorax to be approximately 1% of cases requiring chest tube as well as the risk of valvular heart injury additionally the risk of Xdqeas-W-Gthc infection which requires removal.  Patient  has had previous Qaharh-O-Erqu placements and numerous scars on her chest and neck from previous Oautbd-A-Puic.  Patient also understands the possibility of inability to place Ktwmvv-B-Fwyz with previous history of numerous surgical interventions for such.  Her last operative procedure was aborted, Gvugmx-Z-Zuik, because of lack of targets.  Will attempt for again Ghqfzn-Y-Bavc placement.  Risk benefits were discussed.  Patient voiced understanding wished to proceed.    Patient instructed to discontinue Xarelto therapy 3 days prior to chemo port placement.    Patient instructed that best way to communicate with my office staff is for patient to get [facility] portal to expedite communication and communication issues that may occur.  Patient was given instructions on how to get on the portal.  I encouraged patient to obtain portal access as well.  Ultimately it is up to the patient to obtain access.  Patient voiced understanding.

## 2020-04-28 DIAGNOSIS — K58.1 IRRITABLE BOWEL SYNDROME WITH CONSTIPATION: ICD-10-CM

## 2020-06-24 DIAGNOSIS — I87.2 PERIPHERAL VENOUS INSUFFICIENCY: Primary | ICD-10-CM

## 2020-07-06 ENCOUNTER — OFFICE VISIT (OUTPATIENT)
Dept: GASTROENTEROLOGY | Facility: CLINIC | Age: 65
End: 2020-07-06
Payer: MEDICARE

## 2020-07-06 VITALS
TEMPERATURE: 100 F | HEIGHT: 64 IN | WEIGHT: 157 LBS | SYSTOLIC BLOOD PRESSURE: 208 MMHG | BODY MASS INDEX: 26.8 KG/M2 | OXYGEN SATURATION: 98 % | DIASTOLIC BLOOD PRESSURE: 116 MMHG | HEART RATE: 110 BPM | RESPIRATION RATE: 20 BRPM

## 2020-07-06 DIAGNOSIS — R12 PYROSIS: ICD-10-CM

## 2020-07-06 DIAGNOSIS — R11.14 BILIOUS VOMITING WITH NAUSEA: ICD-10-CM

## 2020-07-06 DIAGNOSIS — R10.13 DYSPEPSIA: ICD-10-CM

## 2020-07-06 DIAGNOSIS — K57.30 DIVERTICULOSIS OF LARGE INTESTINE WITHOUT HEMORRHAGE: ICD-10-CM

## 2020-07-06 DIAGNOSIS — R11.0 NAUSEA: ICD-10-CM

## 2020-07-06 DIAGNOSIS — K21.9 GASTROESOPHAGEAL REFLUX DISEASE WITHOUT ESOPHAGITIS: ICD-10-CM

## 2020-07-06 DIAGNOSIS — K59.00 CONSTIPATION, UNSPECIFIED CONSTIPATION TYPE: ICD-10-CM

## 2020-07-06 DIAGNOSIS — R14.0 ABDOMINAL DISTENSION: ICD-10-CM

## 2020-07-06 DIAGNOSIS — G35 MULTIPLE SCLEROSIS: ICD-10-CM

## 2020-07-06 DIAGNOSIS — K44.9 HIATAL HERNIA: ICD-10-CM

## 2020-07-06 DIAGNOSIS — R10.9 ABDOMINAL PAIN, UNSPECIFIED ABDOMINAL LOCATION: ICD-10-CM

## 2020-07-06 DIAGNOSIS — K86.1 IDIOPATHIC CHRONIC PANCREATITIS: ICD-10-CM

## 2020-07-06 DIAGNOSIS — K21.00 GASTROESOPHAGEAL REFLUX DISEASE WITH ESOPHAGITIS: ICD-10-CM

## 2020-07-06 DIAGNOSIS — Z98.0 HISTORY OF BYPASS GASTROJEJUNOSTOMY: ICD-10-CM

## 2020-07-06 DIAGNOSIS — R07.9 CHEST PAIN, UNSPECIFIED TYPE: Primary | ICD-10-CM

## 2020-07-06 PROCEDURE — 99215 OFFICE O/P EST HI 40 MIN: CPT | Mod: PBBFAC,PN | Performed by: INTERNAL MEDICINE

## 2020-07-06 PROCEDURE — 99214 OFFICE O/P EST MOD 30 MIN: CPT | Mod: S$PBB,,, | Performed by: INTERNAL MEDICINE

## 2020-07-06 PROCEDURE — 99214 PR OFFICE/OUTPT VISIT, EST, LEVL IV, 30-39 MIN: ICD-10-PCS | Mod: S$PBB,,, | Performed by: INTERNAL MEDICINE

## 2020-07-06 PROCEDURE — 99999 PR PBB SHADOW E&M-EST. PATIENT-LVL V: CPT | Mod: PBBFAC,,, | Performed by: INTERNAL MEDICINE

## 2020-07-06 PROCEDURE — 99999 PR PBB SHADOW E&M-EST. PATIENT-LVL V: ICD-10-PCS | Mod: PBBFAC,,, | Performed by: INTERNAL MEDICINE

## 2020-07-06 RX ORDER — DEXLANSOPRAZOLE 60 MG/1
60 CAPSULE, DELAYED RELEASE ORAL DAILY
Qty: 90 CAPSULE | Refills: 3 | Status: SHIPPED | OUTPATIENT
Start: 2020-07-06 | End: 2021-03-22 | Stop reason: SDUPTHER

## 2020-07-06 RX ORDER — CEPHALEXIN 500 MG/1
CAPSULE ORAL
COMMUNITY
Start: 2020-05-08

## 2020-07-06 RX ORDER — PROMETHAZINE HYDROCHLORIDE 25 MG/1
25 SUPPOSITORY RECTAL EVERY 6 HOURS PRN
Qty: 10 SUPPOSITORY | Refills: 0 | Status: SHIPPED | OUTPATIENT
Start: 2020-07-06 | End: 2021-03-22 | Stop reason: SDUPTHER

## 2020-07-06 RX ORDER — PROMETHAZINE HYDROCHLORIDE 50 MG/1
50 TABLET ORAL EVERY 6 HOURS PRN
Qty: 60 TABLET | Refills: 0 | Status: SHIPPED | OUTPATIENT
Start: 2020-07-06 | End: 2020-08-10 | Stop reason: SDUPTHER

## 2020-07-06 RX ORDER — FAMOTIDINE 40 MG/1
40 TABLET, FILM COATED ORAL NIGHTLY
Qty: 90 TABLET | Refills: 3 | Status: SHIPPED | OUTPATIENT
Start: 2020-07-06 | End: 2021-04-05

## 2020-07-06 NOTE — PROGRESS NOTES
Subjective:       Patient ID: Tessie De La Rosa is a 65 y.o. female.    Chief Complaint: Follow-up    She has a history of chronic pancreatitis.  She has had a cholecystectomy.  She has a duodenal obstruction secondary to the chronic pancreatitis.  Recent glucose was 271 in the alkaline phosphatases was mildly elevated.  She has had a gastrojejunostomy.  It has needed ill a take a shin due to scarring.  She has a history of hiatal hernia gastroesophageal reflux.  She generally takes the Dexilant.  She has had occasional breakthrough symptoms with afternoon pyrosis.  She has had occasional nausea without vomiting uses the Phenergan.  She denies dysphagia aspiration hematemesis hematochezia jaundice or bleeding.  She generally has daily bowel movements with occasional constipation.  She has a history of diverticulosis and hemorrhoids.  She has had a colonoscopy within the last 5 years.  She recently underwent upper endoscopy and dilatation the gastrojejunostomy.  She is being evaluated by the surgeon for and Mtvpvv-Z-Oqaf.  She has extremely difficult of IV access.  She has a history of multiple sclerosis and is seen multiple neurologists.  She has not seen the current neurologist because of difficulty with appointments and also because she was told that she did not have multiple sclerosis.  In the past her neurologist is performed extensive neurologic evaluation and she does have multiple sclerosis.  She fell and broke her right foot.  She did not did not lose consciousness or associated cardiac symptoms.  She has seen the orthopedist and has a cast on placed.  She is able to ambulate.  She has not followed up with her cardiologist.  She has had atypical chest pain this difficult to describe but probably not exertional.  She wants to see a cardiologist and a another and neurologist.  She has never used tobacco.  She denies dyspnea on exertion.  She denies dysphagia aspiration hemoptysis chronic cough or chronic  sputum production.  She is not as physically active.  She denies usage.  She denies tobacco alcohol usage at this time.  Her weight remains stable.      Allergies:  Review of patient's allergies indicates:   Allergen Reactions    Morphine      Muscle spasms, ABD pain    Quinine      Causes thrombocytopenia    Alprazolam     Diazepam     Duloxetine hcl     Pregabalin        Medications:    Current Outpatient Medications:     baclofen (LIORESAL) 10 MG tablet, , Disp: , Rfl: 0    cephALEXin (KEFLEX) 500 MG capsule, , Disp: , Rfl:     cholecalciferol, vitamin D3, 125 mcg (5,000 unit) Tab, , Disp: , Rfl:     cyanocobalamin 1,000 mcg/mL injection, Inject 1 mL (1,000 mcg total) into the muscle every 30 days., Disp: 1 mL, Rfl: 5    dexlansoprazole (DEXILANT) 60 mg capsule, Take 1 capsule (60 mg total) by mouth once daily., Disp: 90 capsule, Rfl: 3    diclofenac sodium (VOLTAREN) 1 % Gel, , Disp: , Rfl: 0    diltiaZEM (CARDIZEM CD) 120 MG Cp24, , Disp: , Rfl: 4    ergocalciferol (ERGOCALCIFEROL) 50,000 unit Cap, , Disp: , Rfl: 2    escitalopram oxalate (LEXAPRO) 10 MG tablet, Take 1 tablet (10 mg total) by mouth once daily., Disp: 30 tablet, Rfl: 5    furosemide (LASIX) 20 MG tablet, , Disp: , Rfl: 1    hydrALAZINE (APRESOLINE) 25 MG tablet, , Disp: , Rfl: 5    hyoscyamine (LEVSIN/SL) 0.125 mg Subl, Place 1 tablet (0.125 mg total) under the tongue every 4 (four) hours as needed., Disp: 20 tablet, Rfl: 2    linaCLOtide (LINZESS) 145 mcg Cap capsule, Take 1 capsule (145 mcg total) by mouth once daily., Disp: 90 capsule, Rfl: 3    metFORMIN (GLUCOPHAGE) 500 MG tablet, TAKE ONE TABLET BY MOUTH TWICE DAILY, Disp: 180 tablet, Rfl: 1    metoprolol succinate (TOPROL-XL) 50 MG 24 hr tablet, , Disp: , Rfl: 1    oxyCODONE (ROXICODONE) 10 mg Tab immediate release tablet, , Disp: , Rfl:     PREMARIN 0.625 mg tablet, , Disp: , Rfl: 1    promethazine (PHENERGAN) 25 MG suppository, Place 1 suppository (25 mg total)  rectally every 6 (six) hours as needed for Nausea., Disp: 10 suppository, Rfl: 0    promethazine (PHENERGAN) 50 MG tablet, Take 1 tablet (50 mg total) by mouth every 6 (six) hours as needed for Nausea., Disp: 60 tablet, Rfl: 0    QUEtiapine (SEROQUEL) 300 MG Tab, , Disp: , Rfl: 11    tiZANidine (ZANAFLEX) 4 MG tablet, , Disp: , Rfl:     triamcinolone acetonide 0.1% (KENALOG) 0.1 % cream, , Disp: , Rfl: 2    VENTOLIN HFA 90 mcg/actuation inhaler, , Disp: , Rfl: 1    XARELTO 20 mg Tab, , Disp: , Rfl: 1    famotidine (PEPCID) 40 MG tablet, Take 1 tablet (40 mg total) by mouth every evening., Disp: 90 tablet, Rfl: 3    Current Facility-Administered Medications:     lidocaine (PF) 10 mg/ml (1%) injection 10 mg, 1 mL, Intradermal, Once, Vince Crowley MD    Past Medical History:   Diagnosis Date    Acute pancreatitis     Anemia     Chronic constipation     Diverticulitis     Diverticulosis     GERD (gastroesophageal reflux disease)     Irritable bowel syndrome     Kidney disease, chronic, stage III (GFR 30-59 ml/min)     Multiple sclerosis 1985    Neurogenic bladder     Pancreatitis, chronic        Past Surgical History:   Procedure Laterality Date    CHOLECYSTECTOMY      COLONOSCOPY      ESOPHAGEAL DILATION N/A 5/21/2019    Procedure: DILATION, ESOPHAGUS;  Surgeon: Kareem Lopez MD;  Location: [facility];  Service: Endoscopy;  Laterality: N/A;    ESOPHAGOGASTRODUODENOSCOPY N/A 5/21/2019    Procedure: EGD (ESOPHAGOGASTRODUODENOSCOPY);  Surgeon: Kareem Lopez MD;  Location: [facility];  Service: Endoscopy;  Laterality: N/A;    ESOPHAGOGASTRODUODENOSCOPY N/A 2/18/2020    Procedure: EGD W/ DILAT (ESOPHAGOGASTRODUODENOSCOPY WITH DILATION);  Surgeon: Kareem Lopez MD;  Location: [facility];  Service: Endoscopy;  Laterality: N/A;    EXPLORATORY LAPAROTOMY      GASTRIC BYPASS      UPPER GASTROINTESTINAL ENDOSCOPY           Review of Systems   Constitutional: Negative for appetite change, fever  and unexpected weight change.   HENT: Negative for trouble swallowing.         No jaundice.   Respiratory: Negative for cough, shortness of breath and wheezing.         No Rales, Rhonchi, or Dyspnea.   Cardiovascular: Negative for chest pain.        She denies exertional chest pain but has had atypical chest pain.  It is not associated with activity or rest.  Sometimes it is associated with pyrosis.  She denies rhythm disturbance.  She has not seen her cardiologist in quite a while.   Gastrointestinal: Positive for abdominal pain and nausea. Negative for abdominal distention, anal bleeding, blood in stool, constipation, diarrhea and rectal pain.        She has chronic abdominal pain and is seen in the Pain Clinic.  She takes her medications as prescribed.  She avoids anti-inflammatory agents.  She has history of anemia gastroesophageal reflux and gastritis.  She made a food diary and she avoids the offending spicy and fatty foods.  She generally ingest the foods that she can tolerate.   Genitourinary:        She is followed by the nephrologist for chronic renal disease and he a G regulates her diabetes.   Musculoskeletal: Positive for arthralgias and back pain. Negative for neck pain.   Skin: Negative for pallor and rash.   Neurological: Negative for dizziness, seizures, syncope, speech difficulty, weakness and numbness.   Hematological: Negative for adenopathy.   Psychiatric/Behavioral: Negative for confusion.       Objective:      Physical Exam  Vitals signs reviewed.   Constitutional:       Appearance: She is well-developed.      Comments: Well well-hydrated nonicteric afebrile white female.  She is oriented x3.  She is normocephalic.  Pupils are normal.  She can relate her history and answer questions appropriately.   HENT:      Head: Normocephalic.   Eyes:      Pupils: Pupils are equal, round, and reactive to light.   Neck:      Musculoskeletal: Normal range of motion and neck supple.      Thyroid: No  thyromegaly.      Trachea: No tracheal deviation.   Cardiovascular:      Rate and Rhythm: Normal rate and regular rhythm.      Heart sounds: Normal heart sounds.   Pulmonary:      Effort: Pulmonary effort is normal.      Breath sounds: Normal breath sounds.   Abdominal:      General: Bowel sounds are normal. There is no distension.      Palpations: There is no mass.      Tenderness: There is abdominal tenderness. There is no right CVA tenderness, left CVA tenderness, guarding or rebound.      Hernia: No hernia is present.      Comments: Surgical scar   Musculoskeletal: Normal range of motion.      Comments: She ambulates slowly.  She has a brace on the right lower extremity.  She can go from the sitting the standing position difficulty.   Lymphadenopathy:      Cervical: No cervical adenopathy.   Skin:     General: Skin is warm and dry.   Neurological:      Mental Status: She is alert and oriented to person, place, and time.      Cranial Nerves: No cranial nerve deficit.   Psychiatric:         Behavior: Behavior normal.           Plan:       Chest pain, unspecified type  -     Ambulatory referral/consult to Cardiology; Future; Expected date: 07/13/2020    Multiple sclerosis  -     Ambulatory referral/consult to Neurology; Future; Expected date: 07/13/2020    Bilious vomiting with nausea  -     promethazine (PHENERGAN) 25 MG suppository; Place 1 suppository (25 mg total) rectally every 6 (six) hours as needed for Nausea.  Dispense: 10 suppository; Refill: 0    Nausea  -     promethazine (PHENERGAN) 50 MG tablet; Take 1 tablet (50 mg total) by mouth every 6 (six) hours as needed for Nausea.  Dispense: 60 tablet; Refill: 0    Gastroesophageal reflux disease without esophagitis  -     dexlansoprazole (DEXILANT) 60 mg capsule; Take 1 capsule (60 mg total) by mouth once daily.  Dispense: 90 capsule; Refill: 3  -     famotidine (PEPCID) 40 MG tablet; Take 1 tablet (40 mg total) by mouth every evening.  Dispense: 90 tablet;  Refill: 3    Abdominal pain, unspecified abdominal location    Abdominal distension    Gastroesophageal reflux disease with esophagitis    Hiatal hernia    Diverticulosis of large intestine without hemorrhage    History of bypass gastrojejunostomy    Idiopathic chronic pancreatitis    Pyrosis    Dyspepsia    Constipation, unspecified constipation type     She will continue her reflux regimen current medications vitamins and minerals.  She administers her on B12.  She will make a food diary and avoid the offending foods.  She has history of diverticulosis but with her current bowel program stool softeners and MiraLax.  She is having daily bowel movements.  She continues the diabetic diet follows up with the nephrologist.  She is scheduled to have the Mnutbl-O-Ohyi placed by the surgeon.  She is referred to a neurologist to evaluate her falling and multiple sclerosis.  She has a history of atypical chest pain and is referred to the cardiologist

## 2020-07-06 NOTE — PATIENT INSTRUCTIONS
She will continue her reflux regimen current medications.  She will take the Dexilant in the morning and she started on the Pepcid at night.  She will make a food diary and avoid the offending foods.  She is to stay on the ADA diet.  She is followed by the nephrologist for the diabetes.  She wants to see a new neurologist for the multiple sclerosis.  Additionally she has had atypical chest pain and she will be referred to Dr. Duncan gold.  She is scheduled to have the Semhvb-T-Ddae placed by the surgeon.

## 2020-07-10 ENCOUNTER — TELEPHONE (OUTPATIENT)
Dept: SURGERY | Facility: CLINIC | Age: 65
End: 2020-07-10

## 2020-07-10 DIAGNOSIS — Z79.4 TYPE 2 DIABETES MELLITUS WITHOUT COMPLICATION, WITH LONG-TERM CURRENT USE OF INSULIN: ICD-10-CM

## 2020-07-10 DIAGNOSIS — E10.9 TYPE 1 DIABETES MELLITUS WITHOUT COMPLICATION: Primary | ICD-10-CM

## 2020-07-10 DIAGNOSIS — E11.9 TYPE 2 DIABETES MELLITUS WITHOUT COMPLICATION, WITH LONG-TERM CURRENT USE OF INSULIN: ICD-10-CM

## 2020-07-10 NOTE — TELEPHONE ENCOUNTER
Pt requesting refill Metformin 500 mg      ----- Message from Kalina Smith sent at 7/10/2020  1:16 PM CDT -----  Regarding: Med Refill  Contact: pt  Med Refill   '  Patient called and asked for a refill on her Metformin 500 MG   Takes twice a day.  90 day Refill   Pharmacy    - [place] [place] - [address] [place] [address];    Call back [phone]

## 2020-07-10 NOTE — TELEPHONE ENCOUNTER
Returned call. Appointments scheduled. Patient to call when ready.   ----- Message from Kalina Smith sent at 7/10/2020  1:14 PM CDT -----  Contact: pt  Patient called she is asking to wait on her surgery she is asking to have in done in the next few   Months due to the Virus and the increase of cases ,    Call back [phone]

## 2020-07-13 RX ORDER — METFORMIN HYDROCHLORIDE 500 MG/1
500 TABLET ORAL 2 TIMES DAILY
Qty: 180 TABLET | Refills: 1 | Status: SHIPPED | OUTPATIENT
Start: 2020-07-13

## 2020-08-10 DIAGNOSIS — R11.0 NAUSEA: ICD-10-CM

## 2020-08-10 RX ORDER — PROMETHAZINE HYDROCHLORIDE 50 MG/1
50 TABLET ORAL EVERY 6 HOURS PRN
Qty: 60 TABLET | Refills: 1 | Status: SHIPPED | OUTPATIENT
Start: 2020-08-10 | End: 2020-11-13

## 2020-08-10 NOTE — TELEPHONE ENCOUNTER
----- Message from Soila Cruz sent at 8/10/2020  8:20 AM CDT -----  Type: Needs Medical Advice  Who Called:  Patient  Pharmacy name and phone #:  [facility]  [facility] Call Back Number: [phone] (home)   Additional Information: the patient is calling in for a refill for the Rx promethazine (PHENERGAN) 50 mg ----she said she is out and need to pick it up today asap please call to advise

## 2020-10-06 ENCOUNTER — OFFICE VISIT (OUTPATIENT)
Dept: GASTROENTEROLOGY | Facility: CLINIC | Age: 65
End: 2020-10-06
Payer: MEDICARE

## 2020-10-06 VITALS
HEART RATE: 65 BPM | DIASTOLIC BLOOD PRESSURE: 82 MMHG | WEIGHT: 151 LBS | SYSTOLIC BLOOD PRESSURE: 170 MMHG | TEMPERATURE: 98 F | RESPIRATION RATE: 18 BRPM | OXYGEN SATURATION: 97 % | HEIGHT: 64 IN | BODY MASS INDEX: 25.78 KG/M2

## 2020-10-06 DIAGNOSIS — Z98.0 HISTORY OF BYPASS GASTROJEJUNOSTOMY: ICD-10-CM

## 2020-10-06 DIAGNOSIS — R11.2 NAUSEA AND VOMITING, INTRACTABILITY OF VOMITING NOT SPECIFIED, UNSPECIFIED VOMITING TYPE: ICD-10-CM

## 2020-10-06 DIAGNOSIS — K57.30 DIVERTICULOSIS OF LARGE INTESTINE WITHOUT HEMORRHAGE: ICD-10-CM

## 2020-10-06 DIAGNOSIS — K44.9 HIATAL HERNIA: ICD-10-CM

## 2020-10-06 DIAGNOSIS — K21.00 GASTROESOPHAGEAL REFLUX DISEASE WITH ESOPHAGITIS, UNSPECIFIED WHETHER HEMORRHAGE: ICD-10-CM

## 2020-10-06 DIAGNOSIS — R10.9 ABDOMINAL PAIN, UNSPECIFIED ABDOMINAL LOCATION: Primary | ICD-10-CM

## 2020-10-06 DIAGNOSIS — K22.2 ESOPHAGEAL STRICTURE: ICD-10-CM

## 2020-10-06 DIAGNOSIS — R10.13 DYSPEPSIA: ICD-10-CM

## 2020-10-06 DIAGNOSIS — K56.699 ANASTOMOTIC STENOSIS OF GASTROJEJUNOSTOMY: ICD-10-CM

## 2020-10-06 DIAGNOSIS — K86.1 IDIOPATHIC CHRONIC PANCREATITIS: ICD-10-CM

## 2020-10-06 DIAGNOSIS — R12 PYROSIS: ICD-10-CM

## 2020-10-06 PROCEDURE — 99214 PR OFFICE/OUTPT VISIT, EST, LEVL IV, 30-39 MIN: ICD-10-PCS | Mod: S$PBB,,, | Performed by: INTERNAL MEDICINE

## 2020-10-06 PROCEDURE — 99215 OFFICE O/P EST HI 40 MIN: CPT | Mod: PBBFAC,PN | Performed by: INTERNAL MEDICINE

## 2020-10-06 PROCEDURE — 99999 PR PBB SHADOW E&M-EST. PATIENT-LVL V: ICD-10-PCS | Mod: PBBFAC,,, | Performed by: INTERNAL MEDICINE

## 2020-10-06 PROCEDURE — 99214 OFFICE O/P EST MOD 30 MIN: CPT | Mod: S$PBB,,, | Performed by: INTERNAL MEDICINE

## 2020-10-06 PROCEDURE — 99999 PR PBB SHADOW E&M-EST. PATIENT-LVL V: CPT | Mod: PBBFAC,,, | Performed by: INTERNAL MEDICINE

## 2020-10-06 RX ORDER — CLONIDINE HYDROCHLORIDE 0.1 MG/1
0.1 TABLET ORAL 2 TIMES DAILY
COMMUNITY
Start: 2020-09-11

## 2020-10-06 RX ORDER — METOPROLOL TARTRATE 100 MG/1
150 TABLET ORAL 2 TIMES DAILY
COMMUNITY
Start: 2020-09-11

## 2020-10-06 RX ORDER — LISINOPRIL 20 MG/1
40 TABLET ORAL DAILY
COMMUNITY
Start: 2020-09-11 | End: 2021-05-31

## 2020-10-06 NOTE — PATIENT INSTRUCTIONS
She will follow-up with the cardiologist who is regulating her blood pressure.  I have requested the [facility] records and labs.  She will continue her current diet and reflux regimen.  She will make a food diary and attempt to identify the cause of the diarrhea although she has been started on multiple medications that could cause the diarrhea.  She does need  the Linzess.  She will continue her vitamins minerals and current medications.  She continues her reflux regimen.

## 2020-10-07 NOTE — PROGRESS NOTES
Subjective:       Patient ID: Tessie De La Rosa is a 65 y.o. female.    Chief Complaint: Follow-up (3m)    She goes to the Pain Clinic.  She has chronic pancreatitis and chronic abdominal pain.  She has tolerated her diet with minimal nausea.  She denies vomiting.  She denies dysphagia aspiration hematemesis hematochezia jaundice or bleeding.  She has chronic pancreatitis with duodenal obstruction and has a gastrojejunostomy.  In the past she has undergone dilatation of the anastomosis.  She has gastroesophageal reflux hiatal hernia and diverticulosis.  She is having daily bowel movements.  She denies fever chills.  She denies tobacco alcohol.  She decided not to have the Vhsgab-M-Ltfn.  She had the last endoscopic procedure was associated with extreme difficulty obtaining an IV.  She will reconsider vascular access.  She has not followed up with the nephrologist or the neurologist.  She is seeing a new cardiologist who is regulating her blood pressure.  She denies exertional chest pain or rhythm disturbance.  She denies fever chills.  She is physically active.  She has occasional nausea.  She has nausea without vomiting and cannot associated with the specific food.  In general she tries to ingest the foods that she can tolerate.  The generally low residue.  She generally has daily bowel movements.      Allergies:  Review of patient's allergies indicates:   Allergen Reactions    Morphine      Muscle spasms, ABD pain    Quinine      Causes thrombocytopenia    Alprazolam     Diazepam     Duloxetine hcl     Pregabalin        Medications:    Current Outpatient Medications:     baclofen (LIORESAL) 10 MG tablet, , Disp: , Rfl: 0    cephALEXin (KEFLEX) 500 MG capsule, , Disp: , Rfl:     cholecalciferol, vitamin D3, 125 mcg (5,000 unit) Tab, , Disp: , Rfl:     cloNIDine (CATAPRES) 0.1 MG tablet, , Disp: , Rfl:     dexlansoprazole (DEXILANT) 60 mg capsule, Take 1 capsule (60 mg total) by mouth once daily.,  Disp: 90 capsule, Rfl: 3    diclofenac sodium (VOLTAREN) 1 % Gel, , Disp: , Rfl: 0    diltiaZEM (CARDIZEM CD) 120 MG Cp24, , Disp: , Rfl: 4    ergocalciferol (ERGOCALCIFEROL) 50,000 unit Cap, , Disp: , Rfl: 2    escitalopram oxalate (LEXAPRO) 10 MG tablet, Take 1 tablet (10 mg total) by mouth once daily., Disp: 30 tablet, Rfl: 5    famotidine (PEPCID) 40 MG tablet, Take 1 tablet (40 mg total) by mouth every evening., Disp: 90 tablet, Rfl: 3    furosemide (LASIX) 20 MG tablet, , Disp: , Rfl: 1    hydrALAZINE (APRESOLINE) 25 MG tablet, , Disp: , Rfl: 5    hyoscyamine (LEVSIN/SL) 0.125 mg Subl, Place 1 tablet (0.125 mg total) under the tongue every 4 (four) hours as needed., Disp: 20 tablet, Rfl: 2    linaCLOtide (LINZESS) 145 mcg Cap capsule, Take 1 capsule (145 mcg total) by mouth once daily., Disp: 90 capsule, Rfl: 3    lisinopriL (PRINIVIL,ZESTRIL) 20 MG tablet, , Disp: , Rfl:     metFORMIN (GLUCOPHAGE) 500 MG tablet, Take 1 tablet (500 mg total) by mouth 2 (two) times daily., Disp: 180 tablet, Rfl: 1    metoprolol tartrate (LOPRESSOR) 100 MG tablet, , Disp: , Rfl:     oxyCODONE (ROXICODONE) 10 mg Tab immediate release tablet, , Disp: , Rfl:     PREMARIN 0.625 mg tablet, , Disp: , Rfl: 1    promethazine (PHENERGAN) 25 MG suppository, Place 1 suppository (25 mg total) rectally every 6 (six) hours as needed for Nausea., Disp: 10 suppository, Rfl: 0    promethazine (PHENERGAN) 50 MG tablet, Take 1 tablet (50 mg total) by mouth every 6 (six) hours as needed for Nausea., Disp: 60 tablet, Rfl: 1    QUEtiapine (SEROQUEL) 300 MG Tab, , Disp: , Rfl: 11    tiZANidine (ZANAFLEX) 4 MG tablet, , Disp: , Rfl:     triamcinolone acetonide 0.1% (KENALOG) 0.1 % cream, , Disp: , Rfl: 2    VENTOLIN HFA 90 mcg/actuation inhaler, , Disp: , Rfl: 1    XARELTO 20 mg Tab, , Disp: , Rfl: 1    Current Facility-Administered Medications:     lidocaine (PF) 10 mg/ml (1%) injection 10 mg, 1 mL, Intradermal, Once, Vince CHERY  MD Keo    Past Medical History:   Diagnosis Date    Acute pancreatitis     Anemia     Chronic constipation     Diverticulitis     Diverticulosis     GERD (gastroesophageal reflux disease)     Irritable bowel syndrome     Kidney disease, chronic, stage III (GFR 30-59 ml/min)     Multiple sclerosis 1985    Neurogenic bladder     Pancreatitis, chronic        Past Surgical History:   Procedure Laterality Date    CHOLECYSTECTOMY      COLONOSCOPY      ESOPHAGEAL DILATION N/A 5/21/2019    Procedure: DILATION, ESOPHAGUS;  Surgeon: Kareem Lopez MD;  Location: [facility] ENDO;  Service: Endoscopy;  Laterality: N/A;    ESOPHAGOGASTRODUODENOSCOPY N/A 5/21/2019    Procedure: EGD (ESOPHAGOGASTRODUODENOSCOPY);  Surgeon: Kareem Lopez MD;  Location: [facility] ENDO;  Service: Endoscopy;  Laterality: N/A;    ESOPHAGOGASTRODUODENOSCOPY N/A 2/18/2020    Procedure: EGD W/ DILAT (ESOPHAGOGASTRODUODENOSCOPY WITH DILATION);  Surgeon: Kareem Lopez MD;  [facility];  Service: Endoscopy;  Laterality: N/A;    EXPLORATORY LAPAROTOMY      GASTRIC BYPASS      UPPER GASTROINTESTINAL ENDOSCOPY           Review of Systems   Constitutional: Negative for appetite change, fever and unexpected weight change.   HENT: Negative for trouble swallowing.         No jaundice.   Respiratory: Negative for cough, shortness of breath and wheezing.         She does not use tobacco alcohol.  She denies dysphagia aspiration hemoptysis chronic cough chronic sputum production or dyspnea on exertion.   Cardiovascular: Negative for chest pain.        She has been evaluated by the cardia retention.  She denies exertional chest pain.  She has a sensation possibly of a fluttering in the chest.  She is scheduled to have a heart monitor for 2 weeks.   Gastrointestinal: Positive for abdominal distention, abdominal pain and nausea. Negative for anal bleeding, blood in stool, constipation, diarrhea and rectal pain.   Endocrine:        She states her  diabetes is well controlled.  She tries to stay on the ADA diet.  She made a food diary and she is states she is avoiding the the off ending foods.  Particularly she avoids the fibrous foods which causes abdominal distension and nausea.   Genitourinary:        He has chronic renal disease and is followed by the nephrologist although she has not followed up recently.   Musculoskeletal: Positive for arthralgias and back pain. Negative for neck pain.   Skin: Negative for pallor and rash.   Neurological: Negative for dizziness, seizures, syncope, speech difficulty, weakness and numbness.        She has a history of remote history of possible multiple sclerosis but she denies specific neurologic symptoms at this time.  She is seen the neurologist in the past.  She has not followed up at this point.   Hematological: Negative for adenopathy.   Psychiatric/Behavioral: Negative for confusion.       Objective:      Physical Exam  Vitals signs reviewed.   Constitutional:       Comments: Well-nourished well-hydrated afebrile nonicteric white female.  She is sitting comfortably in the chair.  She breathing normal.  She is oriented x3.  She can relate her history and answer questions appropriately.  She is normocephalic.  Pupils are normal.   Abdominal:      General: There is no distension.      Palpations: There is no mass.      Tenderness: There is abdominal tenderness. There is no left CVA tenderness, guarding or rebound.      Hernia: No hernia is present.      Comments: Abdomen is soft with surgical scars this tenderness in the epigastrium.  Organomegaly masses are not detected.  Bowel sounds normal.   Musculoskeletal:      Comments: She can ambulate normally.  She can go from the sitting the standing position without difficulty.           Plan:       Abdominal pain, unspecified abdominal location    Nausea and vomiting, intractability of vomiting not specified, unspecified vomiting type    Gastroesophageal reflux disease  with esophagitis, unspecified whether hemorrhage    Hiatal hernia    Anastomotic stenosis of gastrojejunostomy    Diverticulosis of large intestine without hemorrhage    History of bypass gastrojejunostomy    Idiopathic chronic pancreatitis    Pyrosis    Dyspepsia    Esophageal stricture     She will continue her reflux regimen current medications vitamins and minerals.  She follows up with her other physicians.  She she will continue her bowel program.  She continues the diabetic diet.  [facility].  She follows up with the cardiologist.  She will be dilated as needed.

## 2020-11-13 DIAGNOSIS — R11.0 NAUSEA: ICD-10-CM

## 2020-11-13 RX ORDER — PROMETHAZINE HYDROCHLORIDE 50 MG/1
TABLET ORAL
Qty: 60 TABLET | Refills: 1 | Status: SHIPPED | OUTPATIENT
Start: 2020-11-13 | End: 2021-01-05 | Stop reason: SDUPTHER

## 2021-01-05 DIAGNOSIS — R11.0 NAUSEA: ICD-10-CM

## 2021-01-05 RX ORDER — PROMETHAZINE HYDROCHLORIDE 50 MG/1
50 TABLET ORAL EVERY 6 HOURS PRN
Qty: 60 TABLET | Refills: 1 | Status: SHIPPED | OUTPATIENT
Start: 2021-01-05 | End: 2021-03-22 | Stop reason: SDUPTHER

## 2021-03-19 ENCOUNTER — TELEPHONE (OUTPATIENT)
Dept: GASTROENTEROLOGY | Facility: CLINIC | Age: 66
End: 2021-03-19

## 2021-03-22 ENCOUNTER — OFFICE VISIT (OUTPATIENT)
Dept: GASTROENTEROLOGY | Facility: CLINIC | Age: 66
End: 2021-03-22
Payer: MEDICARE

## 2021-03-22 VITALS
HEIGHT: 64 IN | DIASTOLIC BLOOD PRESSURE: 58 MMHG | BODY MASS INDEX: 24.84 KG/M2 | TEMPERATURE: 97 F | RESPIRATION RATE: 17 BRPM | SYSTOLIC BLOOD PRESSURE: 103 MMHG | OXYGEN SATURATION: 99 % | HEART RATE: 67 BPM | WEIGHT: 145.5 LBS

## 2021-03-22 DIAGNOSIS — K56.699 ANASTOMOTIC STENOSIS OF GASTROJEJUNOSTOMY: ICD-10-CM

## 2021-03-22 DIAGNOSIS — Z98.0 HISTORY OF BYPASS GASTROJEJUNOSTOMY: ICD-10-CM

## 2021-03-22 DIAGNOSIS — K21.9 GASTROESOPHAGEAL REFLUX DISEASE WITHOUT ESOPHAGITIS: ICD-10-CM

## 2021-03-22 DIAGNOSIS — R12 PYROSIS: ICD-10-CM

## 2021-03-22 DIAGNOSIS — K44.9 HIATAL HERNIA: ICD-10-CM

## 2021-03-22 DIAGNOSIS — K57.30 DIVERTICULOSIS OF LARGE INTESTINE WITHOUT HEMORRHAGE: ICD-10-CM

## 2021-03-22 DIAGNOSIS — R10.13 DYSPEPSIA: Primary | ICD-10-CM

## 2021-03-22 DIAGNOSIS — R11.0 NAUSEA: ICD-10-CM

## 2021-03-22 DIAGNOSIS — K86.1 IDIOPATHIC CHRONIC PANCREATITIS: ICD-10-CM

## 2021-03-22 DIAGNOSIS — R11.14 BILIOUS VOMITING WITH NAUSEA: ICD-10-CM

## 2021-03-22 DIAGNOSIS — R10.9 ABDOMINAL PAIN, UNSPECIFIED ABDOMINAL LOCATION: ICD-10-CM

## 2021-03-22 PROCEDURE — 99999 PR PBB SHADOW E&M-EST. PATIENT-LVL V: ICD-10-PCS | Mod: PBBFAC,,, | Performed by: INTERNAL MEDICINE

## 2021-03-22 PROCEDURE — 99215 OFFICE O/P EST HI 40 MIN: CPT | Mod: PBBFAC,PN | Performed by: INTERNAL MEDICINE

## 2021-03-22 PROCEDURE — 99214 PR OFFICE/OUTPT VISIT, EST, LEVL IV, 30-39 MIN: ICD-10-PCS | Mod: S$PBB,,, | Performed by: INTERNAL MEDICINE

## 2021-03-22 PROCEDURE — 99214 OFFICE O/P EST MOD 30 MIN: CPT | Mod: S$PBB,,, | Performed by: INTERNAL MEDICINE

## 2021-03-22 PROCEDURE — 99999 PR PBB SHADOW E&M-EST. PATIENT-LVL V: CPT | Mod: PBBFAC,,, | Performed by: INTERNAL MEDICINE

## 2021-03-22 RX ORDER — DEXLANSOPRAZOLE 60 MG/1
60 CAPSULE, DELAYED RELEASE ORAL DAILY
Qty: 90 CAPSULE | Refills: 3 | Status: SHIPPED | OUTPATIENT
Start: 2021-03-22 | End: 2021-07-06 | Stop reason: SDUPTHER

## 2021-03-22 RX ORDER — PROMETHAZINE HYDROCHLORIDE 50 MG/1
50 TABLET ORAL EVERY 6 HOURS PRN
Qty: 60 TABLET | Refills: 1 | Status: SHIPPED | OUTPATIENT
Start: 2021-03-22 | End: 2021-05-31 | Stop reason: SDUPTHER

## 2021-03-22 RX ORDER — PROMETHAZINE HYDROCHLORIDE 25 MG/1
25 SUPPOSITORY RECTAL EVERY 6 HOURS PRN
Qty: 10 SUPPOSITORY | Refills: 0 | Status: SHIPPED | OUTPATIENT
Start: 2021-03-22 | End: 2021-04-05 | Stop reason: ALTCHOICE

## 2021-03-29 DIAGNOSIS — F32.A DEPRESSION, UNSPECIFIED DEPRESSION TYPE: ICD-10-CM

## 2021-03-29 DIAGNOSIS — E53.8 B12 DEFICIENCY: ICD-10-CM

## 2021-03-29 RX ORDER — ESCITALOPRAM OXALATE 10 MG/1
10 TABLET ORAL DAILY
Qty: 30 TABLET | Refills: 5 | Status: SHIPPED | OUTPATIENT
Start: 2021-03-29 | End: 2021-11-02

## 2021-03-29 RX ORDER — CYANOCOBALAMIN 1000 UG/ML
1000 INJECTION, SOLUTION INTRAMUSCULAR; SUBCUTANEOUS
Qty: 1 ML | Refills: 5 | Status: SHIPPED | OUTPATIENT
Start: 2021-03-29 | End: 2021-12-07

## 2021-04-05 ENCOUNTER — OFFICE VISIT (OUTPATIENT)
Dept: GASTROENTEROLOGY | Facility: CLINIC | Age: 66
End: 2021-04-05
Payer: MEDICARE

## 2021-04-05 VITALS
HEART RATE: 64 BPM | HEIGHT: 64 IN | SYSTOLIC BLOOD PRESSURE: 150 MMHG | WEIGHT: 145.81 LBS | RESPIRATION RATE: 16 BRPM | BODY MASS INDEX: 24.89 KG/M2 | DIASTOLIC BLOOD PRESSURE: 82 MMHG | OXYGEN SATURATION: 98 %

## 2021-04-05 DIAGNOSIS — K56.699 ANASTOMOTIC STENOSIS OF GASTROJEJUNOSTOMY: ICD-10-CM

## 2021-04-05 DIAGNOSIS — R14.0 ABDOMINAL DISTENSION: ICD-10-CM

## 2021-04-05 DIAGNOSIS — R11.0 NAUSEA: ICD-10-CM

## 2021-04-05 DIAGNOSIS — K86.1 IDIOPATHIC CHRONIC PANCREATITIS: ICD-10-CM

## 2021-04-05 DIAGNOSIS — K21.00 GASTROESOPHAGEAL REFLUX DISEASE WITH ESOPHAGITIS, UNSPECIFIED WHETHER HEMORRHAGE: ICD-10-CM

## 2021-04-05 DIAGNOSIS — Z98.0 HISTORY OF BYPASS GASTROJEJUNOSTOMY: ICD-10-CM

## 2021-04-05 DIAGNOSIS — K44.9 HIATAL HERNIA: ICD-10-CM

## 2021-04-05 DIAGNOSIS — R10.9 ABDOMINAL PAIN, UNSPECIFIED ABDOMINAL LOCATION: Primary | ICD-10-CM

## 2021-04-05 PROBLEM — K21.9 GASTROESOPHAGEAL REFLUX DISEASE: Status: RESOLVED | Noted: 2019-05-24 | Resolved: 2021-04-05

## 2021-04-05 PROCEDURE — 99215 OFFICE O/P EST HI 40 MIN: CPT | Mod: PBBFAC,PN | Performed by: INTERNAL MEDICINE

## 2021-04-05 PROCEDURE — 99214 PR OFFICE/OUTPT VISIT, EST, LEVL IV, 30-39 MIN: ICD-10-PCS | Mod: S$PBB,,, | Performed by: INTERNAL MEDICINE

## 2021-04-05 PROCEDURE — 99999 PR PBB SHADOW E&M-EST. PATIENT-LVL V: ICD-10-PCS | Mod: PBBFAC,,, | Performed by: INTERNAL MEDICINE

## 2021-04-05 PROCEDURE — 99999 PR PBB SHADOW E&M-EST. PATIENT-LVL V: CPT | Mod: PBBFAC,,, | Performed by: INTERNAL MEDICINE

## 2021-04-05 PROCEDURE — 99214 OFFICE O/P EST MOD 30 MIN: CPT | Mod: S$PBB,,, | Performed by: INTERNAL MEDICINE

## 2021-04-05 RX ORDER — DILTIAZEM HYDROCHLORIDE 180 MG/1
180 CAPSULE, EXTENDED RELEASE ORAL
COMMUNITY
End: 2021-05-31

## 2021-04-05 RX ORDER — TOPIRAMATE 25 MG/1
25 TABLET ORAL DAILY
COMMUNITY
Start: 2021-03-19 | End: 2021-05-31

## 2021-04-07 ENCOUNTER — TELEPHONE (OUTPATIENT)
Dept: GASTROENTEROLOGY | Facility: CLINIC | Age: 66
End: 2021-04-07

## 2021-05-31 ENCOUNTER — OFFICE VISIT (OUTPATIENT)
Dept: GASTROENTEROLOGY | Facility: CLINIC | Age: 66
End: 2021-05-31
Payer: MEDICARE

## 2021-05-31 VITALS
DIASTOLIC BLOOD PRESSURE: 73 MMHG | HEART RATE: 88 BPM | SYSTOLIC BLOOD PRESSURE: 134 MMHG | BODY MASS INDEX: 23.63 KG/M2 | WEIGHT: 138.38 LBS | OXYGEN SATURATION: 97 % | HEIGHT: 64 IN | RESPIRATION RATE: 15 BRPM

## 2021-05-31 DIAGNOSIS — K21.00 GASTROESOPHAGEAL REFLUX DISEASE WITH ESOPHAGITIS, UNSPECIFIED WHETHER HEMORRHAGE: ICD-10-CM

## 2021-05-31 DIAGNOSIS — R10.13 DYSPEPSIA: ICD-10-CM

## 2021-05-31 DIAGNOSIS — R11.0 NAUSEA: ICD-10-CM

## 2021-05-31 DIAGNOSIS — K57.30 DIVERTICULOSIS OF LARGE INTESTINE WITHOUT HEMORRHAGE: ICD-10-CM

## 2021-05-31 DIAGNOSIS — R14.0 ABDOMINAL DISTENSION: ICD-10-CM

## 2021-05-31 DIAGNOSIS — K86.1 IDIOPATHIC CHRONIC PANCREATITIS: ICD-10-CM

## 2021-05-31 DIAGNOSIS — K44.9 HIATAL HERNIA: ICD-10-CM

## 2021-05-31 DIAGNOSIS — R12 PYROSIS: ICD-10-CM

## 2021-05-31 DIAGNOSIS — K21.9 GASTROESOPHAGEAL REFLUX DISEASE, UNSPECIFIED WHETHER ESOPHAGITIS PRESENT: Primary | ICD-10-CM

## 2021-05-31 DIAGNOSIS — Z01.818 PREOP TESTING: Primary | ICD-10-CM

## 2021-05-31 DIAGNOSIS — R10.9 ABDOMINAL PAIN, UNSPECIFIED ABDOMINAL LOCATION: Primary | ICD-10-CM

## 2021-05-31 DIAGNOSIS — Z98.0 HISTORY OF BYPASS GASTROJEJUNOSTOMY: ICD-10-CM

## 2021-05-31 DIAGNOSIS — K56.699 ANASTOMOTIC STENOSIS OF GASTROJEJUNOSTOMY: ICD-10-CM

## 2021-05-31 DIAGNOSIS — K21.9 GERD (GASTROESOPHAGEAL REFLUX DISEASE): ICD-10-CM

## 2021-05-31 PROCEDURE — 99999 PR PBB SHADOW E&M-EST. PATIENT-LVL IV: CPT | Mod: PBBFAC,,, | Performed by: INTERNAL MEDICINE

## 2021-05-31 PROCEDURE — 99214 OFFICE O/P EST MOD 30 MIN: CPT | Mod: S$PBB,,, | Performed by: INTERNAL MEDICINE

## 2021-05-31 PROCEDURE — 99214 PR OFFICE/OUTPT VISIT, EST, LEVL IV, 30-39 MIN: ICD-10-PCS | Mod: S$PBB,,, | Performed by: INTERNAL MEDICINE

## 2021-05-31 PROCEDURE — 99214 OFFICE O/P EST MOD 30 MIN: CPT | Mod: PBBFAC,PN | Performed by: INTERNAL MEDICINE

## 2021-05-31 PROCEDURE — 99999 PR PBB SHADOW E&M-EST. PATIENT-LVL IV: ICD-10-PCS | Mod: PBBFAC,,, | Performed by: INTERNAL MEDICINE

## 2021-05-31 RX ORDER — SODIUM CHLORIDE 0.9 % (FLUSH) 0.9 %
10 SYRINGE (ML) INJECTION
Status: CANCELLED | OUTPATIENT
Start: 2021-05-31

## 2021-05-31 RX ORDER — AMLODIPINE BESYLATE 10 MG/1
10 TABLET ORAL DAILY
COMMUNITY
Start: 2021-05-18

## 2021-05-31 RX ORDER — LISINOPRIL 40 MG/1
40 TABLET ORAL DAILY
COMMUNITY
Start: 2021-05-18

## 2021-06-01 ENCOUNTER — HOSPITAL ENCOUNTER (OUTPATIENT)
Dept: CARDIOLOGY | Facility: HOSPITAL | Age: 66
Discharge: HOME | End: 2021-06-01
Attending: INTERNAL MEDICINE
Payer: MEDICARE

## 2021-06-01 ENCOUNTER — TELEPHONE (OUTPATIENT)
Dept: GASTROENTEROLOGY | Facility: CLINIC | Age: 66
End: 2021-06-01

## 2021-06-01 ENCOUNTER — HOSPITAL ENCOUNTER (OUTPATIENT)
Dept: RADIOLOGY | Facility: HOSPITAL | Age: 66
Discharge: HOME | End: 2021-06-01
Attending: INTERNAL MEDICINE
Payer: MEDICARE

## 2021-06-01 DIAGNOSIS — R10.9 ABDOMINAL PAIN, UNSPECIFIED ABDOMINAL LOCATION: ICD-10-CM

## 2021-06-01 DIAGNOSIS — Z01.818 PREOP TESTING: ICD-10-CM

## 2021-06-01 PROCEDURE — 76700 US EXAM ABDOM COMPLETE: CPT | Mod: 26,,, | Performed by: RADIOLOGY

## 2021-06-01 PROCEDURE — 93005 ELECTROCARDIOGRAM TRACING: CPT

## 2021-06-01 PROCEDURE — 93010 EKG 12-LEAD: ICD-10-PCS | Mod: ,,, | Performed by: INTERNAL MEDICINE

## 2021-06-01 PROCEDURE — 76700 US EXAM ABDOM COMPLETE: CPT | Mod: TC,PN

## 2021-06-01 PROCEDURE — 93010 ELECTROCARDIOGRAM REPORT: CPT | Mod: ,,, | Performed by: INTERNAL MEDICINE

## 2021-06-01 PROCEDURE — 76700 US ABDOMEN COMPLETE: ICD-10-PCS | Mod: 26,,, | Performed by: RADIOLOGY

## 2021-06-01 RX ORDER — PROMETHAZINE HYDROCHLORIDE 50 MG/1
50 TABLET ORAL EVERY 6 HOURS PRN
Qty: 50 TABLET | Refills: 0 | Status: SHIPPED | OUTPATIENT
Start: 2021-06-01 | End: 2021-07-06 | Stop reason: SDUPTHER

## 2021-06-02 ENCOUNTER — ANESTHESIA EVENT (OUTPATIENT)
Dept: SURGERY | Facility: HOSPITAL | Age: 66
End: 2021-06-02
Payer: MEDICARE

## 2021-06-02 ENCOUNTER — HOSPITAL ENCOUNTER (OUTPATIENT)
Facility: HOSPITAL | Age: 66
Discharge: HOME | End: 2021-06-02
Attending: INTERNAL MEDICINE | Admitting: INTERNAL MEDICINE
Payer: MEDICARE

## 2021-06-02 ENCOUNTER — ANESTHESIA (OUTPATIENT)
Dept: SURGERY | Facility: HOSPITAL | Age: 66
End: 2021-06-02
Payer: MEDICARE

## 2021-06-02 DIAGNOSIS — K21.9 GASTROESOPHAGEAL REFLUX DISEASE, UNSPECIFIED WHETHER ESOPHAGITIS PRESENT: ICD-10-CM

## 2021-06-02 DIAGNOSIS — K21.9 GERD (GASTROESOPHAGEAL REFLUX DISEASE): ICD-10-CM

## 2021-06-02 PROCEDURE — 88305 TISSUE EXAM BY PATHOLOGIST: CPT | Performed by: PATHOLOGY

## 2021-06-02 PROCEDURE — 27201423 OPTIME MED/SURG SUP & DEVICES STERILE SUPPLY: Performed by: INTERNAL MEDICINE

## 2021-06-02 PROCEDURE — D9220A PRA ANESTHESIA: ICD-10-PCS | Mod: ,,, | Performed by: ANESTHESIOLOGY

## 2021-06-02 PROCEDURE — 43239 EGD BIOPSY SINGLE/MULTIPLE: CPT | Performed by: INTERNAL MEDICINE

## 2021-06-02 PROCEDURE — 63600175 PHARM REV CODE 636 W HCPCS: Performed by: ANESTHESIOLOGY

## 2021-06-02 PROCEDURE — 88305 TISSUE EXAM BY PATHOLOGIST: CPT | Mod: 26,,, | Performed by: PATHOLOGY

## 2021-06-02 PROCEDURE — D9220A PRA ANESTHESIA: Mod: ,,, | Performed by: ANESTHESIOLOGY

## 2021-06-02 PROCEDURE — 63600175 PHARM REV CODE 636 W HCPCS: Performed by: NURSE ANESTHETIST, CERTIFIED REGISTERED

## 2021-06-02 PROCEDURE — 37000008 HC ANESTHESIA 1ST 15 MINUTES: Performed by: INTERNAL MEDICINE

## 2021-06-02 PROCEDURE — 88305 TISSUE EXAM BY PATHOLOGIST: ICD-10-PCS | Mod: 26,,, | Performed by: PATHOLOGY

## 2021-06-02 PROCEDURE — 25000003 PHARM REV CODE 250: Performed by: ANESTHESIOLOGY

## 2021-06-02 PROCEDURE — 43239 EGD BIOPSY SINGLE/MULTIPLE: CPT | Mod: ,,, | Performed by: INTERNAL MEDICINE

## 2021-06-02 PROCEDURE — 43239 PR EGD, FLEX, W/BIOPSY, SGL/MULTI: ICD-10-PCS | Mod: ,,, | Performed by: INTERNAL MEDICINE

## 2021-06-02 PROCEDURE — 25000003 PHARM REV CODE 250: Performed by: NURSE ANESTHETIST, CERTIFIED REGISTERED

## 2021-06-02 PROCEDURE — 37000009 HC ANESTHESIA EA ADD 15 MINS: Performed by: INTERNAL MEDICINE

## 2021-06-02 RX ORDER — SODIUM CHLORIDE, SODIUM LACTATE, POTASSIUM CHLORIDE, CALCIUM CHLORIDE 600; 310; 30; 20 MG/100ML; MG/100ML; MG/100ML; MG/100ML
INJECTION, SOLUTION INTRAVENOUS CONTINUOUS
Status: DISCONTINUED | OUTPATIENT
Start: 2021-06-02 | End: 2021-06-02 | Stop reason: HOSPADM

## 2021-06-02 RX ORDER — SODIUM CHLORIDE, SODIUM LACTATE, POTASSIUM CHLORIDE, CALCIUM CHLORIDE 600; 310; 30; 20 MG/100ML; MG/100ML; MG/100ML; MG/100ML
125 INJECTION, SOLUTION INTRAVENOUS CONTINUOUS
Status: DISCONTINUED | OUTPATIENT
Start: 2021-06-02 | End: 2021-06-02 | Stop reason: HOSPADM

## 2021-06-02 RX ORDER — PROPOFOL 10 MG/ML
VIAL (ML) INTRAVENOUS
Status: DISCONTINUED | OUTPATIENT
Start: 2021-06-02 | End: 2021-06-02

## 2021-06-02 RX ORDER — DIPHENHYDRAMINE HYDROCHLORIDE 50 MG/ML
12.5 INJECTION INTRAMUSCULAR; INTRAVENOUS
Status: DISCONTINUED | OUTPATIENT
Start: 2021-06-02 | End: 2021-06-02 | Stop reason: HOSPADM

## 2021-06-02 RX ORDER — ONDANSETRON 2 MG/ML
4 INJECTION INTRAMUSCULAR; INTRAVENOUS DAILY PRN
Status: DISCONTINUED | OUTPATIENT
Start: 2021-06-02 | End: 2021-06-02 | Stop reason: HOSPADM

## 2021-06-02 RX ORDER — FAMOTIDINE 10 MG/ML
20 INJECTION INTRAVENOUS ONCE
Status: COMPLETED | OUTPATIENT
Start: 2021-06-02 | End: 2021-06-02

## 2021-06-02 RX ORDER — LIDOCAINE HYDROCHLORIDE 20 MG/ML
INJECTION, SOLUTION EPIDURAL; INFILTRATION; INTRACAUDAL; PERINEURAL
Status: DISCONTINUED | OUTPATIENT
Start: 2021-06-02 | End: 2021-06-02

## 2021-06-02 RX ORDER — LIDOCAINE HYDROCHLORIDE 10 MG/ML
1 INJECTION, SOLUTION EPIDURAL; INFILTRATION; INTRACAUDAL; PERINEURAL ONCE
Status: DISCONTINUED | OUTPATIENT
Start: 2021-06-02 | End: 2021-06-02 | Stop reason: HOSPADM

## 2021-06-02 RX ORDER — FAMOTIDINE 10 MG/ML
INJECTION INTRAVENOUS
Status: DISCONTINUED
Start: 2021-06-02 | End: 2021-06-02 | Stop reason: HOSPADM

## 2021-06-02 RX ORDER — SODIUM CHLORIDE 0.9 % (FLUSH) 0.9 %
10 SYRINGE (ML) INJECTION
Status: DISCONTINUED | OUTPATIENT
Start: 2021-06-02 | End: 2021-06-02 | Stop reason: HOSPADM

## 2021-06-02 RX ADMIN — SODIUM CHLORIDE, POTASSIUM CHLORIDE, SODIUM LACTATE AND CALCIUM CHLORIDE: 600; 310; 30; 20 INJECTION, SOLUTION INTRAVENOUS at 01:06

## 2021-06-02 RX ADMIN — FAMOTIDINE 20 MG: 10 INJECTION INTRAVENOUS at 12:06

## 2021-06-02 RX ADMIN — LIDOCAINE HYDROCHLORIDE 50 MG: 20 INJECTION, SOLUTION EPIDURAL; INFILTRATION; INTRACAUDAL; PERINEURAL at 01:06

## 2021-06-02 RX ADMIN — PROPOFOL 30 MG: 10 INJECTION, EMULSION INTRAVENOUS at 01:06

## 2021-06-03 VITALS
BODY MASS INDEX: 23.22 KG/M2 | RESPIRATION RATE: 13 BRPM | OXYGEN SATURATION: 98 % | DIASTOLIC BLOOD PRESSURE: 62 MMHG | TEMPERATURE: 98 F | SYSTOLIC BLOOD PRESSURE: 132 MMHG | WEIGHT: 136 LBS | HEIGHT: 64 IN | HEART RATE: 75 BPM

## 2021-06-09 LAB
FINAL PATHOLOGIC DIAGNOSIS: NORMAL
GROSS: NORMAL
Lab: NORMAL

## 2021-07-06 ENCOUNTER — OFFICE VISIT (OUTPATIENT)
Dept: GASTROENTEROLOGY | Facility: CLINIC | Age: 66
End: 2021-07-06
Payer: MEDICARE

## 2021-07-06 VITALS
HEIGHT: 64 IN | WEIGHT: 136 LBS | HEART RATE: 65 BPM | SYSTOLIC BLOOD PRESSURE: 146 MMHG | DIASTOLIC BLOOD PRESSURE: 75 MMHG | BODY MASS INDEX: 23.22 KG/M2 | OXYGEN SATURATION: 98 % | RESPIRATION RATE: 14 BRPM

## 2021-07-06 DIAGNOSIS — R14.0 ABDOMINAL DISTENSION: ICD-10-CM

## 2021-07-06 DIAGNOSIS — R11.0 NAUSEA: ICD-10-CM

## 2021-07-06 DIAGNOSIS — R10.9 ABDOMINAL PAIN, UNSPECIFIED ABDOMINAL LOCATION: Primary | ICD-10-CM

## 2021-07-06 DIAGNOSIS — R10.13 DYSPEPSIA: ICD-10-CM

## 2021-07-06 DIAGNOSIS — K44.9 HIATAL HERNIA: ICD-10-CM

## 2021-07-06 DIAGNOSIS — K21.9 GASTROESOPHAGEAL REFLUX DISEASE WITHOUT ESOPHAGITIS: ICD-10-CM

## 2021-07-06 DIAGNOSIS — K86.1 IDIOPATHIC CHRONIC PANCREATITIS: ICD-10-CM

## 2021-07-06 DIAGNOSIS — R12 PYROSIS: ICD-10-CM

## 2021-07-06 DIAGNOSIS — K57.30 DIVERTICULOSIS OF LARGE INTESTINE WITHOUT HEMORRHAGE: ICD-10-CM

## 2021-07-06 DIAGNOSIS — Z98.0 HISTORY OF BYPASS GASTROJEJUNOSTOMY: ICD-10-CM

## 2021-07-06 DIAGNOSIS — K21.00 GASTROESOPHAGEAL REFLUX DISEASE WITH ESOPHAGITIS, UNSPECIFIED WHETHER HEMORRHAGE: ICD-10-CM

## 2021-07-06 PROBLEM — K56.699 ANASTOMOTIC STENOSIS OF GASTROJEJUNOSTOMY: Status: RESOLVED | Noted: 2019-05-21 | Resolved: 2021-07-06

## 2021-07-06 PROBLEM — K59.00 CONSTIPATION: Status: RESOLVED | Noted: 2019-07-08 | Resolved: 2021-07-06

## 2021-07-06 PROCEDURE — 99214 OFFICE O/P EST MOD 30 MIN: CPT | Mod: S$PBB,,, | Performed by: INTERNAL MEDICINE

## 2021-07-06 PROCEDURE — 99999 PR PBB SHADOW E&M-EST. PATIENT-LVL IV: ICD-10-PCS | Mod: PBBFAC,,, | Performed by: INTERNAL MEDICINE

## 2021-07-06 PROCEDURE — 99214 OFFICE O/P EST MOD 30 MIN: CPT | Mod: PBBFAC,PN | Performed by: INTERNAL MEDICINE

## 2021-07-06 PROCEDURE — 99214 PR OFFICE/OUTPT VISIT, EST, LEVL IV, 30-39 MIN: ICD-10-PCS | Mod: S$PBB,,, | Performed by: INTERNAL MEDICINE

## 2021-07-06 PROCEDURE — 99999 PR PBB SHADOW E&M-EST. PATIENT-LVL IV: CPT | Mod: PBBFAC,,, | Performed by: INTERNAL MEDICINE

## 2021-07-06 RX ORDER — DEXLANSOPRAZOLE 60 MG/1
60 CAPSULE, DELAYED RELEASE ORAL DAILY
Qty: 90 CAPSULE | Refills: 3 | Status: SHIPPED | OUTPATIENT
Start: 2021-07-06 | End: 2022-07-06

## 2021-07-06 RX ORDER — TOPIRAMATE 25 MG/1
25 TABLET ORAL DAILY
COMMUNITY
Start: 2021-06-10

## 2021-07-06 RX ORDER — ERGOCALCIFEROL 1.25 MG/1
50000 CAPSULE ORAL
COMMUNITY
Start: 2021-06-30

## 2021-07-06 RX ORDER — PROMETHAZINE HYDROCHLORIDE 50 MG/1
50 TABLET ORAL EVERY 6 HOURS PRN
Qty: 50 TABLET | Refills: 0 | Status: SHIPPED | OUTPATIENT
Start: 2021-07-06 | End: 2021-10-04 | Stop reason: SDUPTHER

## 2021-07-06 RX ORDER — CEPHALEXIN 250 MG/1
CAPSULE ORAL
COMMUNITY
Start: 2021-05-31

## 2021-07-08 ENCOUNTER — TELEPHONE (OUTPATIENT)
Dept: GASTROENTEROLOGY | Facility: CLINIC | Age: 66
End: 2021-07-08

## 2021-10-04 DIAGNOSIS — R11.0 NAUSEA: ICD-10-CM

## 2021-10-04 RX ORDER — PROMETHAZINE HYDROCHLORIDE 50 MG/1
50 TABLET ORAL EVERY 6 HOURS PRN
Qty: 50 TABLET | Refills: 0 | Status: SHIPPED | OUTPATIENT
Start: 2021-10-04

## 2021-11-02 DIAGNOSIS — F32.A DEPRESSION, UNSPECIFIED DEPRESSION TYPE: ICD-10-CM

## 2021-11-02 RX ORDER — ESCITALOPRAM OXALATE 10 MG/1
TABLET ORAL
Qty: 30 TABLET | Refills: 5 | Status: SHIPPED | OUTPATIENT
Start: 2021-11-02

## 2021-12-07 DIAGNOSIS — E53.8 B12 DEFICIENCY: ICD-10-CM

## 2021-12-07 RX ORDER — CYANOCOBALAMIN 1000 UG/ML
1000 INJECTION, SOLUTION INTRAMUSCULAR; SUBCUTANEOUS
Qty: 1 ML | Refills: 5 | Status: SHIPPED | OUTPATIENT
Start: 2021-12-07 | End: 2022-06-05

## 2022-09-28 NOTE — TELEPHONE ENCOUNTER
----- Message from Soila Cruz sent at 8/21/2019 11:24 AM CDT -----  Contact: scott  Type: Needs Medical Advice    Who Called:  Patient  Best Call Back Number: [phone] (home)   Additional Information: patient said she is going to have to go to [facility] to have an Iron infusion since the [facility] could not get the IV into her vein they tried sticking her 7 times, she went to [facility] and they tried 6 times still and could not get an IV in her vein, just wanted to update the    
Pt wanted MD to be aware that she would be receiving her IV iron infusions at [facility] in [place].  
rosana

## 2023-03-03 NOTE — PATIENT INSTRUCTIONS
She will continue her current medications diet and activities.  For the occasional pyrosis she can use Gaviscon.  She will be referred to the surgeon for an Mijxhi-F-Ipml.  She does not have blood vessels to start an IV.  She could not receive her IV iron.  Since her duodenum is been bypassed it will be difficult for her to absorb oral iron.  She will continue her other iron and continue her nutritious diet.  She will picking chews in the foods that she can tolerate.   Xelbetsyz Pregnancy And Lactation Text: This medication is Pregnancy Category D and is not considered safe during pregnancy.  The risk during breast feeding is also uncertain.

## (undated) DEVICE — SOL WATER STRL IRR 1000ML

## (undated) DEVICE — FORCEP BIOSY RJ 4 HOT 2.2X240

## (undated) DEVICE — CANISTER SUCTION 3000CC

## (undated) DEVICE — KIT ENDO CARRY ON PROCEDURE

## (undated) DEVICE — KIT ENDO CARRY-ON PROC 100310

## (undated) DEVICE — BITE BLOCK ADULT LATEX FREE